# Patient Record
Sex: FEMALE | Race: WHITE | Employment: OTHER | URBAN - METROPOLITAN AREA
[De-identification: names, ages, dates, MRNs, and addresses within clinical notes are randomized per-mention and may not be internally consistent; named-entity substitution may affect disease eponyms.]

---

## 2017-01-11 PROBLEM — M25.819 SHOULDER IMPINGEMENT: Status: RESOLVED | Noted: 2017-01-11 | Resolved: 2017-01-11

## 2017-01-11 PROBLEM — M25.819 SHOULDER IMPINGEMENT: Status: ACTIVE | Noted: 2017-01-11

## 2017-01-11 PROBLEM — M75.40 SHOULDER IMPINGEMENT: Status: RESOLVED | Noted: 2017-01-11 | Resolved: 2017-01-11

## 2017-01-11 PROBLEM — M19.011 PRIMARY OSTEOARTHRITIS OF RIGHT SHOULDER: Status: ACTIVE | Noted: 2017-01-11

## 2017-01-11 PROBLEM — M75.40 SHOULDER IMPINGEMENT: Status: ACTIVE | Noted: 2017-01-11

## 2018-07-07 ENCOUNTER — HOSPITAL ENCOUNTER (OUTPATIENT)
Age: 78
Discharge: HOME | End: 2018-07-07
Payer: MEDICARE

## 2018-07-07 LAB
ALBUMIN SERPL-MCNC: 4.2 G/DL (ref 3.5–5.2)
ALP BLD-CCNC: 84 U/L (ref 35–104)
ALT SERPL-CCNC: 13 U/L (ref 0–32)
ANION GAP SERPL CALCULATED.3IONS-SCNC: 12 MMOL/L (ref 7–16)
AST SERPL-CCNC: 20 U/L (ref 0–31)
BILIRUB SERPL-MCNC: 0.6 MG/DL (ref 0–1.2)
BUN BLDV-MCNC: 11 MG/DL (ref 8–23)
CALCIUM SERPL-MCNC: 9.4 MG/DL (ref 8.6–10.2)
CHLORIDE BLD-SCNC: 102 MMOL/L (ref 98–107)
CHOLESTEROL, FASTING: 174 MG/DL (ref 0–199)
CO2: 27 MMOL/L (ref 22–29)
CREAT SERPL-MCNC: 0.8 MG/DL (ref 0.5–1)
GFR AFRICAN AMERICAN: >60
GFR NON-AFRICAN AMERICAN: >60 ML/MIN/1.73
GLUCOSE FASTING: 86 MG/DL (ref 74–109)
HCT VFR BLD CALC: 38.8 % (ref 34–48)
HDLC SERPL-MCNC: 82 MG/DL
HEMOGLOBIN: 12.9 G/DL (ref 11.5–15.5)
LDL CHOLESTEROL CALCULATED: 74 MG/DL (ref 0–99)
MCH RBC QN AUTO: 31.1 PG (ref 26–35)
MCHC RBC AUTO-ENTMCNC: 33.2 % (ref 32–34.5)
MCV RBC AUTO: 93.5 FL (ref 80–99.9)
PDW BLD-RTO: 12.2 FL (ref 11.5–15)
PLATELET # BLD: 252 E9/L (ref 130–450)
PMV BLD AUTO: 9.5 FL (ref 7–12)
POTASSIUM SERPL-SCNC: 3.9 MMOL/L (ref 3.5–5)
RBC # BLD: 4.15 E12/L (ref 3.5–5.5)
SODIUM BLD-SCNC: 141 MMOL/L (ref 132–146)
TOTAL PROTEIN: 6.8 G/DL (ref 6.4–8.3)
TRIGLYCERIDE, FASTING: 90 MG/DL (ref 0–149)
VLDLC SERPL CALC-MCNC: 18 MG/DL
WBC # BLD: 5.6 E9/L (ref 4.5–11.5)

## 2018-07-07 PROCEDURE — 36415 COLL VENOUS BLD VENIPUNCTURE: CPT

## 2018-07-07 PROCEDURE — 85027 COMPLETE CBC AUTOMATED: CPT

## 2018-07-07 PROCEDURE — 80053 COMPREHEN METABOLIC PANEL: CPT

## 2018-07-07 PROCEDURE — 80061 LIPID PANEL: CPT

## 2018-07-13 ENCOUNTER — HOSPITAL ENCOUNTER (OUTPATIENT)
Age: 78
Discharge: HOME | End: 2018-07-15
Payer: MEDICARE

## 2018-07-13 ENCOUNTER — HOSPITAL ENCOUNTER (OUTPATIENT)
Dept: GENERAL RADIOLOGY | Age: 78
Discharge: HOME | End: 2018-07-15
Payer: MEDICARE

## 2018-07-13 DIAGNOSIS — M19.90 ARTHRITIS: ICD-10-CM

## 2018-07-13 PROCEDURE — 73030 X-RAY EXAM OF SHOULDER: CPT

## 2018-07-13 PROCEDURE — 73564 X-RAY EXAM KNEE 4 OR MORE: CPT

## 2018-10-11 ENCOUNTER — OFFICE VISIT (OUTPATIENT)
Dept: ORTHOPEDIC SURGERY | Age: 78
End: 2018-10-11
Payer: MEDICARE

## 2018-10-11 VITALS
TEMPERATURE: 97.7 F | DIASTOLIC BLOOD PRESSURE: 82 MMHG | WEIGHT: 142 LBS | HEIGHT: 63 IN | HEART RATE: 94 BPM | SYSTOLIC BLOOD PRESSURE: 144 MMHG | BODY MASS INDEX: 25.16 KG/M2

## 2018-10-11 DIAGNOSIS — M19.011 PRIMARY OSTEOARTHRITIS OF RIGHT SHOULDER: ICD-10-CM

## 2018-10-11 DIAGNOSIS — M17.11 PRIMARY OSTEOARTHRITIS OF RIGHT KNEE: Primary | ICD-10-CM

## 2018-10-11 PROCEDURE — 1036F TOBACCO NON-USER: CPT | Performed by: ORTHOPAEDIC SURGERY

## 2018-10-11 PROCEDURE — G8419 CALC BMI OUT NRM PARAM NOF/U: HCPCS | Performed by: ORTHOPAEDIC SURGERY

## 2018-10-11 PROCEDURE — G8484 FLU IMMUNIZE NO ADMIN: HCPCS | Performed by: ORTHOPAEDIC SURGERY

## 2018-10-11 PROCEDURE — 99214 OFFICE O/P EST MOD 30 MIN: CPT | Performed by: ORTHOPAEDIC SURGERY

## 2018-10-11 PROCEDURE — G8427 DOCREV CUR MEDS BY ELIG CLIN: HCPCS | Performed by: ORTHOPAEDIC SURGERY

## 2018-10-11 PROCEDURE — 4040F PNEUMOC VAC/ADMIN/RCVD: CPT | Performed by: ORTHOPAEDIC SURGERY

## 2018-10-11 PROCEDURE — 20610 DRAIN/INJ JOINT/BURSA W/O US: CPT | Performed by: ORTHOPAEDIC SURGERY

## 2018-10-11 PROCEDURE — G8399 PT W/DXA RESULTS DOCUMENT: HCPCS | Performed by: ORTHOPAEDIC SURGERY

## 2018-10-11 PROCEDURE — 1123F ACP DISCUSS/DSCN MKR DOCD: CPT | Performed by: ORTHOPAEDIC SURGERY

## 2018-10-11 PROCEDURE — 1101F PT FALLS ASSESS-DOCD LE1/YR: CPT | Performed by: ORTHOPAEDIC SURGERY

## 2018-10-11 PROCEDURE — 1090F PRES/ABSN URINE INCON ASSESS: CPT | Performed by: ORTHOPAEDIC SURGERY

## 2018-10-11 PROCEDURE — L1810 KO ELASTIC WITH JOINTS: HCPCS | Performed by: ORTHOPAEDIC SURGERY

## 2018-10-11 RX ORDER — TRIAMCINOLONE ACETONIDE 40 MG/ML
80 INJECTION, SUSPENSION INTRA-ARTICULAR; INTRAMUSCULAR ONCE
Status: COMPLETED | OUTPATIENT
Start: 2018-10-11 | End: 2018-10-11

## 2018-10-11 RX ORDER — PREDNISOLONE ACETATE 10 MG/ML
1 SUSPENSION/ DROPS OPHTHALMIC DAILY PRN
COMMUNITY
Start: 2018-08-21 | End: 2019-09-04 | Stop reason: ALTCHOICE

## 2018-10-11 RX ORDER — BUPIVACAINE HYDROCHLORIDE 2.5 MG/ML
3 INJECTION, SOLUTION INFILTRATION; PERINEURAL ONCE
Status: COMPLETED | OUTPATIENT
Start: 2018-10-11 | End: 2018-10-11

## 2018-10-11 RX ADMIN — TRIAMCINOLONE ACETONIDE 80 MG: 40 INJECTION, SUSPENSION INTRA-ARTICULAR; INTRAMUSCULAR at 10:45

## 2018-10-11 RX ADMIN — BUPIVACAINE HYDROCHLORIDE 7.5 MG: 2.5 INJECTION, SOLUTION INFILTRATION; PERINEURAL at 10:44

## 2018-10-11 NOTE — PROGRESS NOTES
 Cholecalciferol (VITAMIN D3) 3000 UNITS TABS Take 1,000 Units by mouth daily      acetaminophen (TYLENOL) 500 MG tablet Take 500 mg by mouth every 6 hours as needed for Pain      clopidogrel (PLAVIX) 75 MG tablet Take 75 mg by mouth daily.  NONFORMULARY 1 drop 4 times daily Indications: Blood serum eye drops, one drop each eye daily       No current facility-administered medications for this visit. No Known Allergies    Social History     Social History    Marital status:      Spouse name: N/A    Number of children: N/A    Years of education: N/A     Occupational History    retired      Social History Main Topics    Smoking status: Never Smoker    Smokeless tobacco: Never Used    Alcohol use No    Drug use: No    Sexual activity: Not Asked     Other Topics Concern    None     Social History Narrative    None       Family History   Problem Relation Age of Onset    Cancer Mother     Heart Disease Father          Review of Systems   No fever, chills, or other constitutionalsymptoms. No numbness or other neuro symptoms. [unfilled]   Right knee has no erythema or effusion but there is clinical valgus that is increased on stance. Range of motion 0-125°. No pain with right hip rotation. Right shoulder has some muscle atrophy no effusion. Able to flex and abduct near 90° with some crepitation. Gross strength is intact. Physical Exam    Patient is alert and oriented. Well-developed well-nourished. BMI 25  Pupils equal and reactive. Scleraeanicteric. Neck supple  Lungs clear. Cardiac rate and rhythm regular. Abdomen soft and nontender. Skin warm and dry. XRAY: Recent right knee x-rays multiple views reviewed. Significant diffuse osteopenia. They are not weightbearing views, but significant degenerative changes are suggested in the lateral compartment with osteophyte formation and some sclerosis subchondral lateral femoral condyle.  Patellofemoral degenerative changes are also present. Impression: Severe primarily lateral compartment osteoarthritis right knee. ASSESSMENT/PLAN:    Harriet Lozano was seen today for knee pain and shoulder pain. Diagnoses and all orders for this visit:    Primary osteoarthritis of right knee  -     [place] ARTHROCENTESIS ASPIR&/INJ MAJOR JT/BURSA W/O US  -     Ambulatory referral to Physical Therapy  -     [place] KO ELASTIC WITH JOINTS    Primary osteoarthritis of right shoulder    Other orders  -     bupivacaine (MARCAINE) 0.25 % injection 7.5 mg; Inject 3 mLs into the articular space once  -     triamcinolone acetonide (KENALOG-40) injection 80 mg; Inject 2 mLs into the articular space once     With respect to her right knee, conservative versus operative options were reviewed. Given the severity of her objective findings, I think likely that only total knee arthroplasty will provide major relief. Her only family is in [facility] and she feels that if she was to have any surgery she would have to pursue with there. In terms of conservative measures she did wish to try steroid injection, hinged knee brace and physical therapy. After review of indications, risks and limitations, after alcohol prep, right knee injection with triamcinolone 80mg and 3 cc .25% marcaine given today. Pt tolerated without complication. Return if symptoms worsen or fail to improve. Harriet Lozano will follow-up with me depending on her response to the above measures.       Kami Koo MD    10/11/2018  1:18 PM

## 2018-11-20 ENCOUNTER — EVALUATION (OUTPATIENT)
Dept: PHYSICAL THERAPY | Age: 78
End: 2018-11-20
Payer: MEDICARE

## 2018-11-20 DIAGNOSIS — M17.11 PRIMARY OSTEOARTHRITIS OF RIGHT KNEE: Primary | ICD-10-CM

## 2018-11-20 DIAGNOSIS — M53.2X7 SPINAL INSTABILITY, LUMBOSACRAL: ICD-10-CM

## 2018-11-20 DIAGNOSIS — M54.41 RIGHT-SIDED LOW BACK PAIN WITH RIGHT-SIDED SCIATICA, UNSPECIFIED CHRONICITY: ICD-10-CM

## 2018-11-20 PROCEDURE — 97535 SELF CARE MNGMENT TRAINING: CPT | Performed by: PHYSICAL THERAPIST

## 2018-11-20 PROCEDURE — 97112 NEUROMUSCULAR REEDUCATION: CPT | Performed by: PHYSICAL THERAPIST

## 2018-11-20 PROCEDURE — 97162 PT EVAL MOD COMPLEX 30 MIN: CPT | Performed by: PHYSICAL THERAPIST

## 2018-11-21 PROCEDURE — G8978 MOBILITY CURRENT STATUS: HCPCS | Performed by: PHYSICAL THERAPIST

## 2018-11-21 PROCEDURE — G8979 MOBILITY GOAL STATUS: HCPCS | Performed by: PHYSICAL THERAPIST

## 2018-11-23 ENCOUNTER — TREATMENT (OUTPATIENT)
Dept: PHYSICAL THERAPY | Age: 78
End: 2018-11-23
Payer: MEDICARE

## 2018-11-23 DIAGNOSIS — M17.11 PRIMARY OSTEOARTHRITIS OF RIGHT KNEE: Primary | ICD-10-CM

## 2018-11-23 DIAGNOSIS — M53.2X7 SPINAL INSTABILITY, LUMBOSACRAL: ICD-10-CM

## 2018-11-23 DIAGNOSIS — M54.41 RIGHT-SIDED LOW BACK PAIN WITH RIGHT-SIDED SCIATICA, UNSPECIFIED CHRONICITY: ICD-10-CM

## 2018-11-23 PROCEDURE — 97530 THERAPEUTIC ACTIVITIES: CPT | Performed by: PHYSICAL THERAPIST

## 2018-11-23 PROCEDURE — 97110 THERAPEUTIC EXERCISES: CPT | Performed by: PHYSICAL THERAPIST

## 2018-11-23 PROCEDURE — 97112 NEUROMUSCULAR REEDUCATION: CPT | Performed by: PHYSICAL THERAPIST

## 2018-12-04 ENCOUNTER — TREATMENT (OUTPATIENT)
Dept: PHYSICAL THERAPY | Age: 78
End: 2018-12-04
Payer: MEDICARE

## 2018-12-04 DIAGNOSIS — M17.11 PRIMARY OSTEOARTHRITIS OF RIGHT KNEE: Primary | ICD-10-CM

## 2018-12-04 DIAGNOSIS — M54.41 RIGHT-SIDED LOW BACK PAIN WITH RIGHT-SIDED SCIATICA, UNSPECIFIED CHRONICITY: ICD-10-CM

## 2018-12-04 PROCEDURE — 97140 MANUAL THERAPY 1/> REGIONS: CPT | Performed by: PHYSICAL THERAPIST

## 2018-12-04 PROCEDURE — 97112 NEUROMUSCULAR REEDUCATION: CPT | Performed by: PHYSICAL THERAPIST

## 2018-12-04 PROCEDURE — 97110 THERAPEUTIC EXERCISES: CPT | Performed by: PHYSICAL THERAPIST

## 2018-12-07 ENCOUNTER — TREATMENT (OUTPATIENT)
Dept: PHYSICAL THERAPY | Age: 78
End: 2018-12-07
Payer: MEDICARE

## 2018-12-07 DIAGNOSIS — M17.11 PRIMARY OSTEOARTHRITIS OF RIGHT KNEE: Primary | ICD-10-CM

## 2018-12-07 DIAGNOSIS — M53.2X7 SPINAL INSTABILITY, LUMBOSACRAL: ICD-10-CM

## 2018-12-07 DIAGNOSIS — M54.41 RIGHT-SIDED LOW BACK PAIN WITH RIGHT-SIDED SCIATICA, UNSPECIFIED CHRONICITY: ICD-10-CM

## 2018-12-07 PROCEDURE — 97016 VASOPNEUMATIC DEVICE THERAPY: CPT

## 2018-12-07 PROCEDURE — 97530 THERAPEUTIC ACTIVITIES: CPT

## 2018-12-07 PROCEDURE — 97112 NEUROMUSCULAR REEDUCATION: CPT

## 2018-12-11 ENCOUNTER — TREATMENT (OUTPATIENT)
Dept: PHYSICAL THERAPY | Age: 78
End: 2018-12-11
Payer: MEDICARE

## 2018-12-11 DIAGNOSIS — M53.2X7 SPINAL INSTABILITY, LUMBOSACRAL: ICD-10-CM

## 2018-12-11 DIAGNOSIS — M17.11 PRIMARY OSTEOARTHRITIS OF RIGHT KNEE: Primary | ICD-10-CM

## 2018-12-11 DIAGNOSIS — M54.41 RIGHT-SIDED LOW BACK PAIN WITH RIGHT-SIDED SCIATICA, UNSPECIFIED CHRONICITY: ICD-10-CM

## 2018-12-11 PROCEDURE — 97535 SELF CARE MNGMENT TRAINING: CPT | Performed by: PHYSICAL THERAPIST

## 2018-12-11 PROCEDURE — 97035 APP MDLTY 1+ULTRASOUND EA 15: CPT | Performed by: PHYSICAL THERAPIST

## 2018-12-11 PROCEDURE — 97110 THERAPEUTIC EXERCISES: CPT | Performed by: PHYSICAL THERAPIST

## 2018-12-11 PROCEDURE — 97140 MANUAL THERAPY 1/> REGIONS: CPT | Performed by: PHYSICAL THERAPIST

## 2018-12-13 ENCOUNTER — TREATMENT (OUTPATIENT)
Dept: PHYSICAL THERAPY | Age: 78
End: 2018-12-13
Payer: MEDICARE

## 2018-12-13 DIAGNOSIS — M53.2X7 SPINAL INSTABILITY, LUMBOSACRAL: ICD-10-CM

## 2018-12-13 DIAGNOSIS — M17.11 PRIMARY OSTEOARTHRITIS OF RIGHT KNEE: Primary | ICD-10-CM

## 2018-12-13 DIAGNOSIS — M54.41 RIGHT-SIDED LOW BACK PAIN WITH RIGHT-SIDED SCIATICA, UNSPECIFIED CHRONICITY: ICD-10-CM

## 2018-12-13 PROCEDURE — 97140 MANUAL THERAPY 1/> REGIONS: CPT | Performed by: PHYSICAL THERAPIST

## 2018-12-13 PROCEDURE — 97112 NEUROMUSCULAR REEDUCATION: CPT | Performed by: PHYSICAL THERAPIST

## 2018-12-13 PROCEDURE — 97110 THERAPEUTIC EXERCISES: CPT | Performed by: PHYSICAL THERAPIST

## 2018-12-18 ENCOUNTER — HOSPITAL ENCOUNTER (OUTPATIENT)
Dept: MAMMOGRAPHY | Age: 78
Discharge: HOME | End: 2018-12-20
Payer: MEDICARE

## 2018-12-18 ENCOUNTER — TREATMENT (OUTPATIENT)
Dept: PHYSICAL THERAPY | Age: 78
End: 2018-12-18
Payer: MEDICARE

## 2018-12-18 DIAGNOSIS — M54.41 RIGHT-SIDED LOW BACK PAIN WITH RIGHT-SIDED SCIATICA, UNSPECIFIED CHRONICITY: ICD-10-CM

## 2018-12-18 DIAGNOSIS — M53.2X7 SPINAL INSTABILITY, LUMBOSACRAL: ICD-10-CM

## 2018-12-18 DIAGNOSIS — M17.11 PRIMARY OSTEOARTHRITIS OF RIGHT KNEE: Primary | ICD-10-CM

## 2018-12-18 DIAGNOSIS — Z12.31 ENCOUNTER FOR SCREENING MAMMOGRAM FOR MALIGNANT NEOPLASM OF BREAST: ICD-10-CM

## 2018-12-18 PROCEDURE — 97140 MANUAL THERAPY 1/> REGIONS: CPT | Performed by: PHYSICAL THERAPIST

## 2018-12-18 PROCEDURE — 77063 BREAST TOMOSYNTHESIS BI: CPT

## 2018-12-18 PROCEDURE — 97530 THERAPEUTIC ACTIVITIES: CPT | Performed by: PHYSICAL THERAPIST

## 2018-12-18 PROCEDURE — 97112 NEUROMUSCULAR REEDUCATION: CPT | Performed by: PHYSICAL THERAPIST

## 2018-12-18 PROCEDURE — 97016 VASOPNEUMATIC DEVICE THERAPY: CPT | Performed by: PHYSICAL THERAPIST

## 2018-12-18 NOTE — PROGRESS NOTES
on rehab sheet and will      be discharged to HEP. [] Tolerated today's therapy session with:  [x] Comment: manual therapy corrected the inflare and anterior tilt of R ilium. Fatigued with exercises. PT instructed her and she practiced self anterior tilt correction technique for home. Advised her to perform at least qd or as needed as she feels \"shorter on RLE\" weight bearing. 6/10 knee pain following exercises. []  Other:    Method of Education: [x] Verbal  [x] Demo  [] Written    Comprehension of Education:  [x] Verbalizes understanding. [] Demonstrates understanding. [] Needs review. [] Demonstrates/verbalizes HEP/Ed previously given. PLAN       [] Re-evaluate next session. [x] Continue per plan of care. [] Progress to discharge/HEP over the next ____ visits. [] Hold skilled PT d/t:    [] Discharge from skilled PT services                [x] Advance:   PREs/CKC as tolerated  [] Discontinue:     due to:     [x] Monitor between session response to: change in manual therapy and progression in exercises.        Electronically signed by:  Darion Watkins, [address] [place] [address]

## 2018-12-20 ENCOUNTER — TREATMENT (OUTPATIENT)
Dept: PHYSICAL THERAPY | Age: 78
End: 2018-12-20
Payer: MEDICARE

## 2018-12-20 DIAGNOSIS — M17.11 PRIMARY OSTEOARTHRITIS OF RIGHT KNEE: Primary | ICD-10-CM

## 2018-12-20 DIAGNOSIS — M54.41 RIGHT-SIDED LOW BACK PAIN WITH RIGHT-SIDED SCIATICA, UNSPECIFIED CHRONICITY: ICD-10-CM

## 2018-12-20 DIAGNOSIS — M53.2X7 SPINAL INSTABILITY, LUMBOSACRAL: ICD-10-CM

## 2018-12-20 PROCEDURE — 97140 MANUAL THERAPY 1/> REGIONS: CPT | Performed by: PHYSICAL THERAPIST

## 2018-12-20 PROCEDURE — 97016 VASOPNEUMATIC DEVICE THERAPY: CPT | Performed by: PHYSICAL THERAPIST

## 2018-12-20 PROCEDURE — 97112 NEUROMUSCULAR REEDUCATION: CPT | Performed by: PHYSICAL THERAPIST

## 2018-12-20 PROCEDURE — 97530 THERAPEUTIC ACTIVITIES: CPT | Performed by: PHYSICAL THERAPIST

## 2018-12-20 NOTE — PROGRESS NOTES
PHYSICAL THERAPY DAILY TREATMENT NOTE    Date:  2018   Patient: Tavo Winkler  : 1940  MRN: [id number]  Physician: Joe Castaneda MD  [facility]       Dx: Primary osteoarthritis of right knee [M17.11]    Treatment Dx:    Visit Diagnoses       Codes    Right-sided low back pain with right-sided sciatica, unspecified chronicity     M54.41    Spinal instability, lumbosacral     M53.2X7            Area treated: *R knee    SUBJECTIVE     Pain:  [] Yes  [] No Location:   Pain Rating: (0-10 scale) /10    Daily Comments: Pt reports she still feels like her R LE is shorter than her left and she is walking unbalanced due to this. Outcomes:     OBJECTIVE     See rehab sheet for today's treatment    Time In: 10:30 a           Time Out: 11:45             Visit #: 8    Treatment Charges:   Mins Units   Modalities   15 1   Ther Exercise   4 0   Neuromuscular Re-Education   34 2   Thera Activities   10 1   Manual Therapy   12 1   ADL/Home Mgt       Gait Training       Group Therapy       Non-Billable Service Time       Other     Total Time/Units 75 5     [] Group therapy/ non-billed for portions of treatment that were considered simultaneous activities per certain provider guidelines. [] Pre/post/intraservice treatment measurements included in treatment:    []   Ther Ex                                                                                                                          []   Manual therapy                                                                                                           []   Other:    Palpation:  ROM:  Strength:  Joint Integrity:    ASSESSMENT       [] Progressing toward goals. [] No change  [] Patient required verbal/tactile cueing for prompting/correction for:  [] Patient demonstrates ability to progress intensity for the following therapeutic procedures per rehab sheet.   [] Patient demonstrates independence in procedures

## 2018-12-28 ENCOUNTER — TREATMENT (OUTPATIENT)
Dept: PHYSICAL THERAPY | Age: 78
End: 2018-12-28
Payer: MEDICARE

## 2018-12-28 DIAGNOSIS — M17.11 PRIMARY OSTEOARTHRITIS OF RIGHT KNEE: Primary | ICD-10-CM

## 2018-12-28 DIAGNOSIS — M53.2X7 SPINAL INSTABILITY, LUMBOSACRAL: ICD-10-CM

## 2018-12-28 DIAGNOSIS — M54.41 RIGHT-SIDED LOW BACK PAIN WITH RIGHT-SIDED SCIATICA, UNSPECIFIED CHRONICITY: ICD-10-CM

## 2018-12-28 PROCEDURE — 97112 NEUROMUSCULAR REEDUCATION: CPT | Performed by: PHYSICAL THERAPIST

## 2018-12-28 PROCEDURE — 97140 MANUAL THERAPY 1/> REGIONS: CPT | Performed by: PHYSICAL THERAPIST

## 2018-12-28 PROCEDURE — 97530 THERAPEUTIC ACTIVITIES: CPT | Performed by: PHYSICAL THERAPIST

## 2018-12-28 PROCEDURE — 97016 VASOPNEUMATIC DEVICE THERAPY: CPT | Performed by: PHYSICAL THERAPIST

## 2018-12-28 NOTE — PROGRESS NOTES
and will      be discharged to HEP. [] Tolerated today's therapy session with:  [x] Comment:       Invasive Procedure Time Out:     Correct Patient -2 indentifiers:  [] Yes    Medical history, Contraindications, Review of Medications:  [] Yes    Location Confirmed:  [] Yes    Patient Agreement on Location: [] Yes    Informed Consent: Written    Pt. Education:  [] Yes  [] No                               []   Reviewed Prior HEP/Ed       []  Instructed in and practiced updated HEP. []  Other:    Method of Education: [] Verbal  [] Demo  [] Written    Comprehension of Education:  [] Verbalizes understanding. [] Demonstrates understanding. [] Needs review. [] Demonstrates/verbalizes HEP/Ed previously given. PLAN         [] Re-evaluate next session. [x] Continue per plan of care. [] Progress to discharge/HEP over the next ____ visits.   [] Hold skilled PT d/t:    [] Discharge from skilled PT services                [x] Advance:    as tolerated  [] Discontinue:     due to:     [] Monitor between session response to:      Electronically signed by:  Gema Silva [id number]

## 2018-12-31 ENCOUNTER — TREATMENT (OUTPATIENT)
Dept: PHYSICAL THERAPY | Age: 78
End: 2018-12-31
Payer: MEDICARE

## 2018-12-31 DIAGNOSIS — M17.11 PRIMARY OSTEOARTHRITIS OF RIGHT KNEE: Primary | ICD-10-CM

## 2018-12-31 DIAGNOSIS — M54.41 RIGHT-SIDED LOW BACK PAIN WITH RIGHT-SIDED SCIATICA, UNSPECIFIED CHRONICITY: ICD-10-CM

## 2018-12-31 DIAGNOSIS — M53.2X7 SPINAL INSTABILITY, LUMBOSACRAL: ICD-10-CM

## 2018-12-31 PROCEDURE — 97530 THERAPEUTIC ACTIVITIES: CPT | Performed by: PHYSICAL THERAPIST

## 2018-12-31 PROCEDURE — 97112 NEUROMUSCULAR REEDUCATION: CPT | Performed by: PHYSICAL THERAPIST

## 2018-12-31 PROCEDURE — 97016 VASOPNEUMATIC DEVICE THERAPY: CPT | Performed by: PHYSICAL THERAPIST

## 2018-12-31 PROCEDURE — G8979 MOBILITY GOAL STATUS: HCPCS | Performed by: PHYSICAL THERAPIST

## 2018-12-31 PROCEDURE — G8978 MOBILITY CURRENT STATUS: HCPCS | Performed by: PHYSICAL THERAPIST

## 2018-12-31 PROCEDURE — 97164 PT RE-EVAL EST PLAN CARE: CPT | Performed by: PHYSICAL THERAPIST

## 2019-01-04 ENCOUNTER — TREATMENT (OUTPATIENT)
Dept: PHYSICAL THERAPY | Age: 79
End: 2019-01-04
Payer: MEDICARE

## 2019-01-04 DIAGNOSIS — M53.2X7 SPINAL INSTABILITY, LUMBOSACRAL: ICD-10-CM

## 2019-01-04 DIAGNOSIS — M54.41 RIGHT-SIDED LOW BACK PAIN WITH RIGHT-SIDED SCIATICA, UNSPECIFIED CHRONICITY: ICD-10-CM

## 2019-01-04 DIAGNOSIS — M17.11 PRIMARY OSTEOARTHRITIS OF RIGHT KNEE: Primary | ICD-10-CM

## 2019-01-04 PROCEDURE — 97110 THERAPEUTIC EXERCISES: CPT | Performed by: PHYSICAL THERAPIST

## 2019-01-04 PROCEDURE — 97016 VASOPNEUMATIC DEVICE THERAPY: CPT | Performed by: PHYSICAL THERAPIST

## 2019-01-04 PROCEDURE — 97530 THERAPEUTIC ACTIVITIES: CPT | Performed by: PHYSICAL THERAPIST

## 2019-01-04 PROCEDURE — 97112 NEUROMUSCULAR REEDUCATION: CPT | Performed by: PHYSICAL THERAPIST

## 2019-01-08 ENCOUNTER — TREATMENT (OUTPATIENT)
Dept: PHYSICAL THERAPY | Age: 79
End: 2019-01-08
Payer: MEDICARE

## 2019-01-08 DIAGNOSIS — M54.41 RIGHT-SIDED LOW BACK PAIN WITH RIGHT-SIDED SCIATICA, UNSPECIFIED CHRONICITY: ICD-10-CM

## 2019-01-08 DIAGNOSIS — M53.2X7 SPINAL INSTABILITY, LUMBOSACRAL: ICD-10-CM

## 2019-01-08 DIAGNOSIS — M17.11 PRIMARY OSTEOARTHRITIS OF RIGHT KNEE: Primary | ICD-10-CM

## 2019-01-08 PROCEDURE — 97110 THERAPEUTIC EXERCISES: CPT | Performed by: PHYSICAL THERAPIST

## 2019-01-08 PROCEDURE — 97140 MANUAL THERAPY 1/> REGIONS: CPT | Performed by: PHYSICAL THERAPIST

## 2019-01-08 PROCEDURE — 97530 THERAPEUTIC ACTIVITIES: CPT | Performed by: PHYSICAL THERAPIST

## 2019-01-08 PROCEDURE — 97112 NEUROMUSCULAR REEDUCATION: CPT | Performed by: PHYSICAL THERAPIST

## 2019-01-10 ENCOUNTER — TREATMENT (OUTPATIENT)
Dept: PHYSICAL THERAPY | Age: 79
End: 2019-01-10
Payer: MEDICARE

## 2019-01-10 DIAGNOSIS — M53.2X7 SPINAL INSTABILITY, LUMBOSACRAL: ICD-10-CM

## 2019-01-10 DIAGNOSIS — M17.11 PRIMARY OSTEOARTHRITIS OF RIGHT KNEE: Primary | ICD-10-CM

## 2019-01-10 DIAGNOSIS — M54.41 RIGHT-SIDED LOW BACK PAIN WITH RIGHT-SIDED SCIATICA, UNSPECIFIED CHRONICITY: ICD-10-CM

## 2019-01-10 PROCEDURE — 97530 THERAPEUTIC ACTIVITIES: CPT | Performed by: PHYSICAL THERAPIST

## 2019-01-10 PROCEDURE — 97110 THERAPEUTIC EXERCISES: CPT | Performed by: PHYSICAL THERAPIST

## 2019-01-10 PROCEDURE — 97112 NEUROMUSCULAR REEDUCATION: CPT | Performed by: PHYSICAL THERAPIST

## 2019-01-14 ENCOUNTER — TREATMENT (OUTPATIENT)
Dept: PHYSICAL THERAPY | Age: 79
End: 2019-01-14
Payer: MEDICARE

## 2019-01-14 DIAGNOSIS — M53.2X7 SPINAL INSTABILITY, LUMBOSACRAL: ICD-10-CM

## 2019-01-14 DIAGNOSIS — M17.11 PRIMARY OSTEOARTHRITIS OF RIGHT KNEE: Primary | ICD-10-CM

## 2019-01-14 DIAGNOSIS — M54.41 RIGHT-SIDED LOW BACK PAIN WITH RIGHT-SIDED SCIATICA, UNSPECIFIED CHRONICITY: ICD-10-CM

## 2019-01-14 PROCEDURE — 97110 THERAPEUTIC EXERCISES: CPT | Performed by: PHYSICAL THERAPIST

## 2019-01-14 PROCEDURE — 97530 THERAPEUTIC ACTIVITIES: CPT | Performed by: PHYSICAL THERAPIST

## 2019-01-14 PROCEDURE — 97112 NEUROMUSCULAR REEDUCATION: CPT | Performed by: PHYSICAL THERAPIST

## 2019-01-18 ENCOUNTER — TREATMENT (OUTPATIENT)
Dept: PHYSICAL THERAPY | Age: 79
End: 2019-01-18
Payer: MEDICARE

## 2019-01-18 DIAGNOSIS — M17.11 PRIMARY OSTEOARTHRITIS OF RIGHT KNEE: ICD-10-CM

## 2019-01-18 DIAGNOSIS — M53.2X7 SPINAL INSTABILITY, LUMBOSACRAL: ICD-10-CM

## 2019-01-18 DIAGNOSIS — M54.41 RIGHT-SIDED LOW BACK PAIN WITH RIGHT-SIDED SCIATICA, UNSPECIFIED CHRONICITY: Primary | ICD-10-CM

## 2019-01-18 PROCEDURE — 97530 THERAPEUTIC ACTIVITIES: CPT | Performed by: PHYSICAL THERAPIST

## 2019-01-18 PROCEDURE — 97110 THERAPEUTIC EXERCISES: CPT | Performed by: PHYSICAL THERAPIST

## 2019-01-18 PROCEDURE — 97112 NEUROMUSCULAR REEDUCATION: CPT | Performed by: PHYSICAL THERAPIST

## 2019-01-24 ENCOUNTER — TREATMENT (OUTPATIENT)
Dept: PHYSICAL THERAPY | Age: 79
End: 2019-01-24
Payer: MEDICARE

## 2019-01-24 DIAGNOSIS — M54.41 RIGHT-SIDED LOW BACK PAIN WITH RIGHT-SIDED SCIATICA, UNSPECIFIED CHRONICITY: Primary | ICD-10-CM

## 2019-01-24 DIAGNOSIS — M53.2X7 SPINAL INSTABILITY, LUMBOSACRAL: ICD-10-CM

## 2019-01-24 DIAGNOSIS — M17.11 PRIMARY OSTEOARTHRITIS OF RIGHT KNEE: ICD-10-CM

## 2019-01-24 PROCEDURE — 97112 NEUROMUSCULAR REEDUCATION: CPT | Performed by: PHYSICAL THERAPIST

## 2019-01-24 PROCEDURE — 97110 THERAPEUTIC EXERCISES: CPT | Performed by: PHYSICAL THERAPIST

## 2019-01-24 PROCEDURE — 97530 THERAPEUTIC ACTIVITIES: CPT | Performed by: PHYSICAL THERAPIST

## 2019-01-29 ENCOUNTER — TREATMENT (OUTPATIENT)
Dept: PHYSICAL THERAPY | Age: 79
End: 2019-01-29
Payer: MEDICARE

## 2019-01-29 DIAGNOSIS — M53.2X7 SPINAL INSTABILITY, LUMBOSACRAL: ICD-10-CM

## 2019-01-29 DIAGNOSIS — M54.41 RIGHT-SIDED LOW BACK PAIN WITH RIGHT-SIDED SCIATICA, UNSPECIFIED CHRONICITY: Primary | ICD-10-CM

## 2019-01-29 DIAGNOSIS — M17.11 PRIMARY OSTEOARTHRITIS OF RIGHT KNEE: ICD-10-CM

## 2019-01-29 PROCEDURE — 97530 THERAPEUTIC ACTIVITIES: CPT | Performed by: PHYSICAL THERAPIST

## 2019-01-29 PROCEDURE — 97110 THERAPEUTIC EXERCISES: CPT | Performed by: PHYSICAL THERAPIST

## 2019-01-29 PROCEDURE — 97112 NEUROMUSCULAR REEDUCATION: CPT | Performed by: PHYSICAL THERAPIST

## 2019-01-31 ENCOUNTER — TREATMENT (OUTPATIENT)
Dept: PHYSICAL THERAPY | Age: 79
End: 2019-01-31
Payer: MEDICARE

## 2019-01-31 DIAGNOSIS — M54.41 RIGHT-SIDED LOW BACK PAIN WITH RIGHT-SIDED SCIATICA, UNSPECIFIED CHRONICITY: Primary | ICD-10-CM

## 2019-01-31 DIAGNOSIS — M17.11 PRIMARY OSTEOARTHRITIS OF RIGHT KNEE: ICD-10-CM

## 2019-01-31 DIAGNOSIS — M53.2X7 SPINAL INSTABILITY, LUMBOSACRAL: ICD-10-CM

## 2019-01-31 PROCEDURE — 97530 THERAPEUTIC ACTIVITIES: CPT | Performed by: PHYSICAL THERAPIST

## 2019-01-31 PROCEDURE — 97110 THERAPEUTIC EXERCISES: CPT | Performed by: PHYSICAL THERAPIST

## 2019-01-31 PROCEDURE — 97112 NEUROMUSCULAR REEDUCATION: CPT | Performed by: PHYSICAL THERAPIST

## 2019-02-05 ENCOUNTER — TREATMENT (OUTPATIENT)
Dept: PHYSICAL THERAPY | Age: 79
End: 2019-02-05
Payer: MEDICARE

## 2019-02-05 DIAGNOSIS — M54.41 RIGHT-SIDED LOW BACK PAIN WITH RIGHT-SIDED SCIATICA, UNSPECIFIED CHRONICITY: Primary | ICD-10-CM

## 2019-02-05 DIAGNOSIS — M53.2X7 SPINAL INSTABILITY, LUMBOSACRAL: ICD-10-CM

## 2019-02-05 DIAGNOSIS — M17.11 PRIMARY OSTEOARTHRITIS OF RIGHT KNEE: ICD-10-CM

## 2019-02-05 PROCEDURE — 97530 THERAPEUTIC ACTIVITIES: CPT | Performed by: PHYSICAL THERAPIST

## 2019-02-05 PROCEDURE — 97112 NEUROMUSCULAR REEDUCATION: CPT | Performed by: PHYSICAL THERAPIST

## 2019-02-05 PROCEDURE — 97110 THERAPEUTIC EXERCISES: CPT | Performed by: PHYSICAL THERAPIST

## 2019-02-07 ENCOUNTER — TREATMENT (OUTPATIENT)
Dept: PHYSICAL THERAPY | Age: 79
End: 2019-02-07
Payer: MEDICARE

## 2019-02-07 DIAGNOSIS — M54.41 RIGHT-SIDED LOW BACK PAIN WITH RIGHT-SIDED SCIATICA, UNSPECIFIED CHRONICITY: Primary | ICD-10-CM

## 2019-02-07 DIAGNOSIS — M53.2X7 SPINAL INSTABILITY, LUMBOSACRAL: ICD-10-CM

## 2019-02-07 DIAGNOSIS — M17.11 PRIMARY OSTEOARTHRITIS OF RIGHT KNEE: ICD-10-CM

## 2019-02-07 PROCEDURE — 97164 PT RE-EVAL EST PLAN CARE: CPT | Performed by: PHYSICAL THERAPIST

## 2019-02-07 PROCEDURE — 97530 THERAPEUTIC ACTIVITIES: CPT | Performed by: PHYSICAL THERAPIST

## 2019-02-07 PROCEDURE — G8980 MOBILITY D/C STATUS: HCPCS | Performed by: PHYSICAL THERAPIST

## 2019-02-07 PROCEDURE — 97110 THERAPEUTIC EXERCISES: CPT | Performed by: PHYSICAL THERAPIST

## 2019-02-07 PROCEDURE — 97112 NEUROMUSCULAR REEDUCATION: CPT | Performed by: PHYSICAL THERAPIST

## 2019-02-07 PROCEDURE — G8979 MOBILITY GOAL STATUS: HCPCS | Performed by: PHYSICAL THERAPIST

## 2019-03-07 ENCOUNTER — HOSPITAL ENCOUNTER (OUTPATIENT)
Age: 79
Discharge: HOME | End: 2019-03-07
Payer: MEDICARE

## 2019-03-07 ENCOUNTER — OFFICE VISIT (OUTPATIENT)
Dept: CARDIOLOGY CLINIC | Age: 79
End: 2019-03-07
Payer: MEDICARE

## 2019-03-07 VITALS
WEIGHT: 146 LBS | SYSTOLIC BLOOD PRESSURE: 136 MMHG | HEIGHT: 63 IN | DIASTOLIC BLOOD PRESSURE: 82 MMHG | BODY MASS INDEX: 25.87 KG/M2 | RESPIRATION RATE: 16 BRPM | HEART RATE: 83 BPM

## 2019-03-07 DIAGNOSIS — R60.0 LOWER EXTREMITY EDEMA: ICD-10-CM

## 2019-03-07 DIAGNOSIS — R07.9 CHEST PAIN, UNSPECIFIED TYPE: Primary | ICD-10-CM

## 2019-03-07 LAB
ALBUMIN SERPL-MCNC: 4.2 G/DL (ref 3.5–5.2)
ALP BLD-CCNC: 94 U/L (ref 35–104)
ALT SERPL-CCNC: 15 U/L (ref 0–32)
ANION GAP SERPL CALCULATED.3IONS-SCNC: 11 MMOL/L (ref 7–16)
AST SERPL-CCNC: 19 U/L (ref 0–31)
BILIRUB SERPL-MCNC: 0.5 MG/DL (ref 0–1.2)
BUN BLDV-MCNC: 10 MG/DL (ref 8–23)
CALCIUM SERPL-MCNC: 9.3 MG/DL (ref 8.6–10.2)
CHLORIDE BLD-SCNC: 100 MMOL/L (ref 98–107)
CHOLESTEROL, FASTING: 172 MG/DL (ref 0–199)
CO2: 28 MMOL/L (ref 22–29)
CREAT SERPL-MCNC: 0.8 MG/DL (ref 0.5–1)
GFR AFRICAN AMERICAN: >60
GFR NON-AFRICAN AMERICAN: >60 ML/MIN/1.73
GLUCOSE BLD-MCNC: 88 MG/DL (ref 74–99)
HCT VFR BLD CALC: 40.2 % (ref 34–48)
HDLC SERPL-MCNC: 85 MG/DL
HEMOGLOBIN: 13 G/DL (ref 11.5–15.5)
LDL CHOLESTEROL CALCULATED: 68 MG/DL (ref 0–99)
MCH RBC QN AUTO: 30.8 PG (ref 26–35)
MCHC RBC AUTO-ENTMCNC: 32.3 % (ref 32–34.5)
MCV RBC AUTO: 95.3 FL (ref 80–99.9)
PDW BLD-RTO: 12.3 FL (ref 11.5–15)
PLATELET # BLD: 250 E9/L (ref 130–450)
PMV BLD AUTO: 9.8 FL (ref 7–12)
POTASSIUM SERPL-SCNC: 4.4 MMOL/L (ref 3.5–5)
RBC # BLD: 4.22 E12/L (ref 3.5–5.5)
SODIUM BLD-SCNC: 139 MMOL/L (ref 132–146)
T4 FREE: 1.15 NG/DL (ref 0.93–1.7)
TOTAL PROTEIN: 7.1 G/DL (ref 6.4–8.3)
TRIGLYCERIDE, FASTING: 97 MG/DL (ref 0–149)
TSH SERPL DL<=0.05 MIU/L-ACNC: 1.88 UIU/ML (ref 0.27–4.2)
VLDLC SERPL CALC-MCNC: 19 MG/DL
WBC # BLD: 4.7 E9/L (ref 4.5–11.5)

## 2019-03-07 PROCEDURE — 84439 ASSAY OF FREE THYROXINE: CPT

## 2019-03-07 PROCEDURE — 36415 COLL VENOUS BLD VENIPUNCTURE: CPT

## 2019-03-07 PROCEDURE — 84443 ASSAY THYROID STIM HORMONE: CPT

## 2019-03-07 PROCEDURE — 1101F PT FALLS ASSESS-DOCD LE1/YR: CPT | Performed by: INTERNAL MEDICINE

## 2019-03-07 PROCEDURE — 80053 COMPREHEN METABOLIC PANEL: CPT

## 2019-03-07 PROCEDURE — 99204 OFFICE O/P NEW MOD 45 MIN: CPT | Performed by: INTERNAL MEDICINE

## 2019-03-07 PROCEDURE — G8419 CALC BMI OUT NRM PARAM NOF/U: HCPCS | Performed by: INTERNAL MEDICINE

## 2019-03-07 PROCEDURE — 80061 LIPID PANEL: CPT

## 2019-03-07 PROCEDURE — 85027 COMPLETE CBC AUTOMATED: CPT

## 2019-03-07 PROCEDURE — G8427 DOCREV CUR MEDS BY ELIG CLIN: HCPCS | Performed by: INTERNAL MEDICINE

## 2019-03-07 PROCEDURE — G8484 FLU IMMUNIZE NO ADMIN: HCPCS | Performed by: INTERNAL MEDICINE

## 2019-03-07 PROCEDURE — 1090F PRES/ABSN URINE INCON ASSESS: CPT | Performed by: INTERNAL MEDICINE

## 2019-03-07 PROCEDURE — 83721 ASSAY OF BLOOD LIPOPROTEIN: CPT

## 2019-03-07 PROCEDURE — 93000 ELECTROCARDIOGRAM COMPLETE: CPT | Performed by: INTERNAL MEDICINE

## 2019-03-11 LAB
Lab: NORMAL
REPORT: NORMAL
THIS TEST SENT TO: NORMAL

## 2019-03-21 ENCOUNTER — HOSPITAL ENCOUNTER (OUTPATIENT)
Dept: CARDIOLOGY | Age: 79
Discharge: HOME | End: 2019-03-21
Payer: MEDICARE

## 2019-03-21 VITALS
HEART RATE: 116 BPM | HEIGHT: 63 IN | WEIGHT: 146 LBS | DIASTOLIC BLOOD PRESSURE: 78 MMHG | BODY MASS INDEX: 25.87 KG/M2 | SYSTOLIC BLOOD PRESSURE: 124 MMHG

## 2019-03-21 DIAGNOSIS — R07.9 CHEST PAIN, UNSPECIFIED TYPE: Primary | ICD-10-CM

## 2019-03-21 DIAGNOSIS — R07.9 CHEST PAIN, UNSPECIFIED TYPE: ICD-10-CM

## 2019-03-21 DIAGNOSIS — R60.0 LOWER EXTREMITY EDEMA: ICD-10-CM

## 2019-03-21 LAB
LV EF: 65 %
LVEF MODALITY: NORMAL

## 2019-03-21 PROCEDURE — 93306 TTE W/DOPPLER COMPLETE: CPT

## 2019-03-21 PROCEDURE — A9500 TC99M SESTAMIBI: HCPCS | Performed by: INTERNAL MEDICINE

## 2019-03-21 PROCEDURE — 2580000003 HC RX 258: Performed by: INTERNAL MEDICINE

## 2019-03-21 PROCEDURE — 93017 CV STRESS TEST TRACING ONLY: CPT

## 2019-03-21 PROCEDURE — 3430000000 HC RX DIAGNOSTIC RADIOPHARMACEUTICAL: Performed by: INTERNAL MEDICINE

## 2019-03-21 PROCEDURE — 6360000002 HC RX W HCPCS: Performed by: INTERNAL MEDICINE

## 2019-03-21 PROCEDURE — 78452 HT MUSCLE IMAGE SPECT MULT: CPT

## 2019-03-21 RX ORDER — SODIUM CHLORIDE 0.9 % (FLUSH) 0.9 %
10 SYRINGE (ML) INJECTION PRN
Status: DISCONTINUED | OUTPATIENT
Start: 2019-03-21 | End: 2019-03-22 | Stop reason: HOSPADM

## 2019-03-21 RX ADMIN — Medication 10 ML: at 10:37

## 2019-03-21 RX ADMIN — Medication 33.5 MILLICURIE: at 10:37

## 2019-03-21 RX ADMIN — Medication 10 ML: at 10:36

## 2019-03-21 RX ADMIN — REGADENOSON 0.4 MG: 0.08 INJECTION, SOLUTION INTRAVENOUS at 10:37

## 2019-03-21 RX ADMIN — Medication 10 ML: at 09:06

## 2019-03-21 RX ADMIN — Medication 10.5 MILLICURIE: at 09:05

## 2019-03-22 ENCOUNTER — TELEPHONE (OUTPATIENT)
Dept: CARDIOLOGY CLINIC | Age: 79
End: 2019-03-22

## 2019-03-22 PROBLEM — I35.1 NONRHEUMATIC AORTIC VALVE INSUFFICIENCY: Status: ACTIVE | Noted: 2019-03-22

## 2019-06-22 ENCOUNTER — APPOINTMENT (OUTPATIENT)
Dept: GENERAL RADIOLOGY | Age: 79
End: 2019-06-22
Payer: MEDICARE

## 2019-06-22 ENCOUNTER — HOSPITAL ENCOUNTER (EMERGENCY)
Age: 79
Discharge: HOME | End: 2019-06-22
Payer: MEDICARE

## 2019-06-22 VITALS
HEART RATE: 92 BPM | OXYGEN SATURATION: 99 % | RESPIRATION RATE: 16 BRPM | TEMPERATURE: 98.5 F | WEIGHT: 144 LBS | DIASTOLIC BLOOD PRESSURE: 81 MMHG | SYSTOLIC BLOOD PRESSURE: 181 MMHG | HEIGHT: 63 IN | BODY MASS INDEX: 25.52 KG/M2

## 2019-06-22 DIAGNOSIS — M47.816 OSTEOARTHRITIS OF LUMBAR SPINE, UNSPECIFIED SPINAL OSTEOARTHRITIS COMPLICATION STATUS: ICD-10-CM

## 2019-06-22 DIAGNOSIS — M25.551 ACUTE RIGHT HIP PAIN: Primary | ICD-10-CM

## 2019-06-22 DIAGNOSIS — M16.11 OSTEOARTHRITIS OF RIGHT HIP, UNSPECIFIED OSTEOARTHRITIS TYPE: ICD-10-CM

## 2019-06-22 PROCEDURE — 73502 X-RAY EXAM HIP UNI 2-3 VIEWS: CPT

## 2019-06-22 PROCEDURE — 72110 X-RAY EXAM L-2 SPINE 4/>VWS: CPT

## 2019-06-22 PROCEDURE — 99283 EMERGENCY DEPT VISIT LOW MDM: CPT

## 2019-06-22 PROCEDURE — 6360000002 HC RX W HCPCS: Performed by: NURSE PRACTITIONER

## 2019-06-22 PROCEDURE — 96372 THER/PROPH/DIAG INJ SC/IM: CPT

## 2019-06-22 PROCEDURE — 6370000000 HC RX 637 (ALT 250 FOR IP): Performed by: NURSE PRACTITIONER

## 2019-06-22 RX ORDER — HYDROCODONE BITARTRATE AND ACETAMINOPHEN 5; 325 MG/1; MG/1
1 TABLET ORAL EVERY 6 HOURS PRN
Qty: 12 TABLET | Refills: 0 | Status: SHIPPED | OUTPATIENT
Start: 2019-06-22 | End: 2019-06-25

## 2019-06-22 RX ORDER — HYDROCODONE BITARTRATE AND ACETAMINOPHEN 5; 325 MG/1; MG/1
1 TABLET ORAL ONCE
Status: COMPLETED | OUTPATIENT
Start: 2019-06-22 | End: 2019-06-22

## 2019-06-22 RX ORDER — KETOROLAC TROMETHAMINE 30 MG/ML
30 INJECTION, SOLUTION INTRAMUSCULAR; INTRAVENOUS ONCE
Status: COMPLETED | OUTPATIENT
Start: 2019-06-22 | End: 2019-06-22

## 2019-06-22 RX ORDER — METHYLPREDNISOLONE 4 MG/1
TABLET ORAL
Qty: 1 KIT | Refills: 0 | Status: SHIPPED | OUTPATIENT
Start: 2019-06-22 | End: 2019-06-28

## 2019-06-22 RX ADMIN — KETOROLAC TROMETHAMINE 30 MG: 30 INJECTION, SOLUTION INTRAMUSCULAR; INTRAVENOUS at 11:01

## 2019-06-22 RX ADMIN — HYDROCODONE BITARTRATE AND ACETAMINOPHEN 1 TABLET: 5; 325 TABLET ORAL at 13:18

## 2019-06-22 ASSESSMENT — PAIN DESCRIPTION - PAIN TYPE: TYPE: ACUTE PAIN

## 2019-06-22 ASSESSMENT — PAIN SCALES - GENERAL
PAINLEVEL_OUTOF10: 9

## 2019-06-22 ASSESSMENT — PAIN DESCRIPTION - ORIENTATION: ORIENTATION: RIGHT

## 2019-06-22 ASSESSMENT — PAIN DESCRIPTION - LOCATION: LOCATION: HIP

## 2019-06-22 NOTE — ED PROVIDER NOTES
[facility]     Department of Emergency Medicine   ED  Provider Note  Admit Date/RoomTime: 6/22/2019 10:11 AM  ED Room: MAGDALENA/MAGDALENA   Chief Complaint   Hip Pain (right since thursday, denies injury, hx back pain / pain and burning radiates to right leg)    History of Present Illness   Source of history provided by:  patient. History/Exam Limitations: none. Ag Mcclain is a 66 y.o. old female who  has a past medical history of Arthritis, History of double vision, Hyperlipidemia, Hypertension, PONV (postoperative nausea and vomiting), Raynaud's disease, and TIA (transient ischemic attack). , presents to the emergency department by private vehicle, for right hip pain which occured several day(s) prior to arrival.  Mechanism of injury/pain was result of an unknown cause. Since onset the symptoms have been mild in degree. Her pain is aggraveated by movement and use, relieved by nothing tried and associated with low back pain that is chronic in nature. Tetanus Status: up to date. There has been no history of head injury, loss of consciousness, neck pain, chest pain, abdominal pain, extremity injury, numbness or weakness. ROS    Pertinent positives and negatives are stated within HPI, all other systems reviewed and are negative. Past Surgical History:   Procedure Laterality Date    APPENDECTOMY      COLONOSCOPY  09/26/2017    EYE SURGERY Bilateral     cataract removal w lense implants    LAPAROSCOPY  1997    ENDOMETRIOSIS    UPPER GASTROINTESTINAL ENDOSCOPY  09/26/2017   Social History:  reports that she has never smoked. She has never used smokeless tobacco. She reports that she does not drink alcohol or use drugs. Family History: family history includes Cancer in her mother; Heart Attack (age of onset: 52) in her father; Heart Disease in her father. Allergies: Patient has no known allergies.     Physical Exam           ED Triage Vitals [06/22/19 1009]   BP Temp Temp Source Pulse Resp SpO2 Height degenerative changes        ED Course / Medical Decision Making     Medications   ketorolac (TORADOL) injection 30 mg (30 mg Intramuscular Given 6/22/19 1101)   HYDROcodone-acetaminophen (NORCO) 5-325 MG per tablet 1 tablet (1 tablet Oral Given 6/22/19 1318)        Re-examination:  6/22/19       Time: 1500   Symptoms  have improved with treatment. Consult(s):   None    Procedure(s):   none    MDM:   At this time the patient is without objective evidence of an acute process requiring hospitalization or inpatient management. They have remained hemodynamically stable throughout their entire ED visit and are stable for discharge with outpatient follow-up. The plan has been discussed in detail and they are aware of the specific conditions for emergent return, as well as the importance of follow-up. Counseling: The emergency provider has spoken with the patient and discussed todays results, in addition to providing specific details for the plan of care and counseling regarding the diagnosis and prognosis. Questions are answered at this time and they are agreeable with the plan. Assessment      1. Acute right hip pain    2. Osteoarthritis of right hip, unspecified osteoarthritis type    3. Osteoarthritis of lumbar spine, unspecified spinal osteoarthritis complication status      Plan   Discharge to home  Patient condition is good    New Medications     Discharge Medication List as of 6/22/2019 12:35 PM      START taking these medications    Details   HYDROcodone-acetaminophen (NORCO) 5-325 MG per tablet Take 1 tablet by mouth every 6 hours as needed for Pain for up to 3 days. , Disp-12 tablet, R-0Print      methylPREDNISolone (MEDROL, ENEIDA,) 4 MG tablet Take by mouth., Disp-1 kit, R-0Print           Electronically signed by KIM Ballard CNP   DD: 6/22/19  **This report was transcribed using voice recognition software.  Every effort was made to ensure accuracy; however, inadvertent computerized transcription errors may be present.   END OF ED PROVIDER NOTE        Marco Barbosa, KIM - JEANNIE  06/22/19 [id number]

## 2019-06-22 NOTE — ED NOTES
Discharge instructions given, medications and follow up instructions reviewed. Patient verbalized understanding, no other noted or stated problems at this time. Patient will follow up with physicians as directed.         Nicolas Ken RN  06/22/19 [id number]

## 2019-06-29 ENCOUNTER — HOSPITAL ENCOUNTER (EMERGENCY)
Age: 79
Discharge: HOME | End: 2019-06-29
Payer: MEDICARE

## 2019-06-29 VITALS
RESPIRATION RATE: 14 BRPM | TEMPERATURE: 97.4 F | WEIGHT: 144 LBS | OXYGEN SATURATION: 97 % | SYSTOLIC BLOOD PRESSURE: 140 MMHG | BODY MASS INDEX: 25.52 KG/M2 | HEART RATE: 119 BPM | DIASTOLIC BLOOD PRESSURE: 66 MMHG | HEIGHT: 63 IN

## 2019-06-29 DIAGNOSIS — M25.551 RIGHT HIP PAIN: Primary | ICD-10-CM

## 2019-06-29 DIAGNOSIS — M16.11 OSTEOARTHRITIS OF RIGHT HIP, UNSPECIFIED OSTEOARTHRITIS TYPE: ICD-10-CM

## 2019-06-29 PROCEDURE — 6360000002 HC RX W HCPCS: Performed by: PHYSICIAN ASSISTANT

## 2019-06-29 PROCEDURE — 96372 THER/PROPH/DIAG INJ SC/IM: CPT

## 2019-06-29 PROCEDURE — 99283 EMERGENCY DEPT VISIT LOW MDM: CPT

## 2019-06-29 RX ORDER — OXYCODONE HYDROCHLORIDE AND ACETAMINOPHEN 5; 325 MG/1; MG/1
.5-1 TABLET ORAL EVERY 4 HOURS PRN
Qty: 12 TABLET | Refills: 0 | Status: SHIPPED | OUTPATIENT
Start: 2019-06-29 | End: 2019-07-02

## 2019-06-29 RX ORDER — MORPHINE SULFATE 2 MG/ML
2 INJECTION, SOLUTION INTRAMUSCULAR; INTRAVENOUS ONCE
Status: COMPLETED | OUTPATIENT
Start: 2019-06-29 | End: 2019-06-29

## 2019-06-29 RX ADMIN — MORPHINE SULFATE 2 MG: 2 INJECTION, SOLUTION INTRAMUSCULAR; INTRAVENOUS at 14:37

## 2019-06-29 ASSESSMENT — PAIN DESCRIPTION - PROGRESSION: CLINICAL_PROGRESSION: GRADUALLY WORSENING

## 2019-06-29 ASSESSMENT — PAIN DESCRIPTION - ORIENTATION: ORIENTATION: RIGHT

## 2019-06-29 ASSESSMENT — PAIN DESCRIPTION - DESCRIPTORS: DESCRIPTORS: ACHING;SHARP

## 2019-06-29 ASSESSMENT — PAIN DESCRIPTION - PAIN TYPE: TYPE: ACUTE PAIN

## 2019-06-29 ASSESSMENT — PAIN SCALES - GENERAL: PAINLEVEL_OUTOF10: 10

## 2019-06-29 ASSESSMENT — PAIN DESCRIPTION - FREQUENCY: FREQUENCY: CONTINUOUS

## 2019-06-29 ASSESSMENT — PAIN DESCRIPTION - ONSET: ONSET: ON-GOING

## 2019-06-29 ASSESSMENT — PAIN DESCRIPTION - LOCATION: LOCATION: HIP

## 2019-06-29 NOTE — ED PROVIDER NOTES
answered at this time and they are agreeable with the plan. Patient understands they must follow-up with PCP and/or orthopedics. RICE discussed. They were educated on signs and symptoms that would require emergent return. Patient was educated on newly prescribed medications. Patient states she does have an ambulatory device at home that she can use to help her around the house. Assessment      1. Right hip pain    2. Osteoarthritis of right hip, unspecified osteoarthritis type      Plan   Discharge to home  Patient condition is good    New Medications     Discharge Medication List as of 6/29/2019  2:52 PM      START taking these medications    Details   oxyCODONE-acetaminophen (PERCOCET) 5-325 MG per tablet Take 0.5-1 tablets by mouth every 4 hours as needed for Pain for up to 3 days. , Disp-12 tablet, R-0Print             Electronically signed by Selene Lindsey PA-C   DD: 6/29/19  **This report was transcribed using voice recognition software. Every effort was made to ensure accuracy; however, inadvertent computerized transcription errors may be present.     END OF ED PROVIDER NOTE      Selene Lindsey PA-C  06/29/19 [id number]

## 2019-07-03 ENCOUNTER — OFFICE VISIT (OUTPATIENT)
Dept: ORTHOPEDIC SURGERY | Age: 79
End: 2019-07-03
Payer: MEDICARE

## 2019-07-03 VITALS
SYSTOLIC BLOOD PRESSURE: 140 MMHG | HEART RATE: 100 BPM | TEMPERATURE: 98.6 F | DIASTOLIC BLOOD PRESSURE: 87 MMHG | WEIGHT: 144 LBS | BODY MASS INDEX: 25.52 KG/M2 | HEIGHT: 63 IN

## 2019-07-03 DIAGNOSIS — M70.61 TROCHANTERIC BURSITIS, RIGHT HIP: Primary | ICD-10-CM

## 2019-07-03 DIAGNOSIS — M76.01 GLUTEAL TENDINITIS, RIGHT HIP: ICD-10-CM

## 2019-07-03 PROCEDURE — G8419 CALC BMI OUT NRM PARAM NOF/U: HCPCS | Performed by: ORTHOPAEDIC SURGERY

## 2019-07-03 PROCEDURE — 99213 OFFICE O/P EST LOW 20 MIN: CPT | Performed by: ORTHOPAEDIC SURGERY

## 2019-07-03 PROCEDURE — 1036F TOBACCO NON-USER: CPT | Performed by: ORTHOPAEDIC SURGERY

## 2019-07-03 PROCEDURE — G8399 PT W/DXA RESULTS DOCUMENT: HCPCS | Performed by: ORTHOPAEDIC SURGERY

## 2019-07-03 PROCEDURE — 1123F ACP DISCUSS/DSCN MKR DOCD: CPT | Performed by: ORTHOPAEDIC SURGERY

## 2019-07-03 PROCEDURE — 20610 DRAIN/INJ JOINT/BURSA W/O US: CPT | Performed by: ORTHOPAEDIC SURGERY

## 2019-07-03 PROCEDURE — G8427 DOCREV CUR MEDS BY ELIG CLIN: HCPCS | Performed by: ORTHOPAEDIC SURGERY

## 2019-07-03 PROCEDURE — 1090F PRES/ABSN URINE INCON ASSESS: CPT | Performed by: ORTHOPAEDIC SURGERY

## 2019-07-03 PROCEDURE — 4040F PNEUMOC VAC/ADMIN/RCVD: CPT | Performed by: ORTHOPAEDIC SURGERY

## 2019-07-03 RX ORDER — TRIAMCINOLONE ACETONIDE 40 MG/ML
40 INJECTION, SUSPENSION INTRA-ARTICULAR; INTRAMUSCULAR ONCE
Status: COMPLETED | OUTPATIENT
Start: 2019-07-03 | End: 2019-07-03

## 2019-07-03 RX ORDER — OXYCODONE HYDROCHLORIDE AND ACETAMINOPHEN 5; 325 MG/1; MG/1
1 TABLET ORAL EVERY 4 HOURS PRN
COMMUNITY
End: 2019-08-19

## 2019-07-03 RX ORDER — LIDOCAINE HYDROCHLORIDE 10 MG/ML
2 INJECTION, SOLUTION INFILTRATION; PERINEURAL ONCE
Status: COMPLETED | OUTPATIENT
Start: 2019-07-03 | End: 2019-07-03

## 2019-07-03 RX ADMIN — LIDOCAINE HYDROCHLORIDE 2 ML: 10 INJECTION, SOLUTION INFILTRATION; PERINEURAL at 16:03

## 2019-07-03 RX ADMIN — TRIAMCINOLONE ACETONIDE 40 MG: 40 INJECTION, SUSPENSION INTRA-ARTICULAR; INTRAMUSCULAR at 16:04

## 2019-07-04 NOTE — PROGRESS NOTES
organizations: None     Relationship status: None    Intimate partner violence:     Fear of current or ex partner: None     Emotionally abused: None     Physically abused: None     Forced sexual activity: None   Other Topics Concern    None   Social History Narrative    None       Family History   Problem Relation Age of Onset    Cancer Mother     Heart Disease Father     Heart Attack Father 52         MI         Review of Systems   No fever, chills, or other constitutionalsymptoms. No numbness or other neuro symptoms. [unfilled]   Overall she is objectively appears more comfortable than her subjective complaints. Patient stands and walks independently with minimal antalgic gait affected by the right hip. Leg lengths are clinically equal.  She is tender to palpation of the greater trochanter and hip abductors on the right. Joint pain is not reproduced with full right hip rotation. Mild discomfort objectively with resisted hip flexion, mild discomfort with resisted hip abduction. Strength measurement appears limited by her effort but is at least 4+/5. Physical Exam    Patient is alert and oriented. Well-developed well-nourished. BMI 25.5  Pupils equal and reactive. Scleraeanicteric. Neck supple  Lungs clear. Cardiac rate and rhythm regular. Abdomen soft and nontender. Skin warm and dry. XRAY: Hospital x-rays pelvis and right hip reviewed by me. There are mild degenerative changes right hip some joint space narrowing the joint space does remain. Slight osteophyte and sclerosis femoral head is intact. No bone lesion seen. Impression: Radiographically mild osteoarthritis right hip. ASSESSMENT/PLAN:    Zaira June was seen today for hip pain.     Diagnoses and all orders for this visit:    Trochanteric bursitis, right hip  -     [place] ARTHROCENTESIS ASPIR&/INJ MAJOR JT/BURSA W/O US  -     Ambulatory referral to Physical Therapy    Gluteal tendinitis, right hip  - MD    7/4/2019  2:36 PM

## 2019-07-08 ENCOUNTER — OFFICE VISIT (OUTPATIENT)
Dept: PAIN MANAGEMENT | Age: 79
End: 2019-07-08
Payer: MEDICARE

## 2019-07-08 VITALS
DIASTOLIC BLOOD PRESSURE: 68 MMHG | OXYGEN SATURATION: 96 % | RESPIRATION RATE: 18 BRPM | HEART RATE: 112 BPM | TEMPERATURE: 98.2 F | BODY MASS INDEX: 25.52 KG/M2 | HEIGHT: 63 IN | SYSTOLIC BLOOD PRESSURE: 124 MMHG | WEIGHT: 144 LBS

## 2019-07-08 DIAGNOSIS — M16.11 PRIMARY OSTEOARTHRITIS OF RIGHT HIP: ICD-10-CM

## 2019-07-08 DIAGNOSIS — M54.50 CHRONIC RIGHT-SIDED LOW BACK PAIN WITHOUT SCIATICA: ICD-10-CM

## 2019-07-08 DIAGNOSIS — M51.36 BULGING LUMBAR DISC: ICD-10-CM

## 2019-07-08 DIAGNOSIS — M47.816 FACET DEGENERATION OF LUMBAR REGION: ICD-10-CM

## 2019-07-08 DIAGNOSIS — M48.061 SPINAL STENOSIS OF LUMBAR REGION WITHOUT NEUROGENIC CLAUDICATION: ICD-10-CM

## 2019-07-08 DIAGNOSIS — G89.29 CHRONIC RIGHT-SIDED LOW BACK PAIN WITHOUT SCIATICA: ICD-10-CM

## 2019-07-08 PROCEDURE — 99214 OFFICE O/P EST MOD 30 MIN: CPT | Performed by: PHYSICIAN ASSISTANT

## 2019-07-08 PROCEDURE — G8399 PT W/DXA RESULTS DOCUMENT: HCPCS | Performed by: PHYSICIAN ASSISTANT

## 2019-07-08 PROCEDURE — 1090F PRES/ABSN URINE INCON ASSESS: CPT | Performed by: PHYSICIAN ASSISTANT

## 2019-07-08 PROCEDURE — G8427 DOCREV CUR MEDS BY ELIG CLIN: HCPCS | Performed by: PHYSICIAN ASSISTANT

## 2019-07-08 PROCEDURE — 1123F ACP DISCUSS/DSCN MKR DOCD: CPT | Performed by: PHYSICIAN ASSISTANT

## 2019-07-08 PROCEDURE — 4040F PNEUMOC VAC/ADMIN/RCVD: CPT | Performed by: PHYSICIAN ASSISTANT

## 2019-07-08 PROCEDURE — G0444 DEPRESSION SCREEN ANNUAL: HCPCS | Performed by: PHYSICIAN ASSISTANT

## 2019-07-08 PROCEDURE — G8419 CALC BMI OUT NRM PARAM NOF/U: HCPCS | Performed by: PHYSICIAN ASSISTANT

## 2019-07-08 PROCEDURE — 1036F TOBACCO NON-USER: CPT | Performed by: PHYSICIAN ASSISTANT

## 2019-07-08 RX ORDER — TIZANIDINE 4 MG/1
2 TABLET ORAL 2 TIMES DAILY PRN
Qty: 20 TABLET | Refills: 0 | Status: SHIPPED | OUTPATIENT
Start: 2019-07-08 | End: 2019-07-18

## 2019-07-08 ASSESSMENT — PATIENT HEALTH QUESTIONNAIRE - PHQ9
8. MOVING OR SPEAKING SO SLOWLY THAT OTHER PEOPLE COULD HAVE NOTICED. OR THE OPPOSITE, BEING SO FIGETY OR RESTLESS THAT YOU HAVE BEEN MOVING AROUND A LOT MORE THAN USUAL: 0
7. TROUBLE CONCENTRATING ON THINGS, SUCH AS READING THE NEWSPAPER OR WATCHING TELEVISION: 0
SUM OF ALL RESPONSES TO PHQ9 QUESTIONS 1 & 2: 4
4. FEELING TIRED OR HAVING LITTLE ENERGY: 3
10. IF YOU CHECKED OFF ANY PROBLEMS, HOW DIFFICULT HAVE THESE PROBLEMS MADE IT FOR YOU TO DO YOUR WORK, TAKE CARE OF THINGS AT HOME, OR GET ALONG WITH OTHER PEOPLE: 2
2. FEELING DOWN, DEPRESSED OR HOPELESS: 1
SUM OF ALL RESPONSES TO PHQ QUESTIONS 1-9: 13
SUM OF ALL RESPONSES TO PHQ QUESTIONS 1-9: 13
1. LITTLE INTEREST OR PLEASURE IN DOING THINGS: 3
3. TROUBLE FALLING OR STAYING ASLEEP: 3
6. FEELING BAD ABOUT YOURSELF - OR THAT YOU ARE A FAILURE OR HAVE LET YOURSELF OR YOUR FAMILY DOWN: 0
9. THOUGHTS THAT YOU WOULD BE BETTER OFF DEAD, OR OF HURTING YOURSELF: 0
5. POOR APPETITE OR OVEREATING: 3

## 2019-07-08 NOTE — PROGRESS NOTES
left  SLR:positive right, negative left, sitting     Extremities:    Tremors:None bilaterally upper and lower  Range of motion:Generally normal shoulders, pain with internal rotation of hips negative. Negative heel tap exam bilaterally. Intact:Yes  Varicose veins:absent   Pulses:radial pulse 2+ left  Cyanosis:none  Edema:Normal      Neurological:    Cranial nerves:normal  Sensory:normal to joint position sense and light touch   Motor:         Right Quadriceps5/5          Left Quadriceps5/5           Right Gastrocnemius5/5    Left Gastrocnemius5/5  Right Ant Tibialis5/5  Left Ant Tibialis5/5  Coordination:normal  Reflexes:    Right Quadriceps reflex2+  Left Quadriceps reflex2+  Right Achilles reflex2+  Left Achilles reflex2+  Gait:antalgic without an assistive device    Dermatology:    Skin:no unusual rashes, no skin lesions, no palpable subcutaneous nodules and good skin turgor    Assessment/Plan:    66year old F with a long history of lower back pain with recent 2 week episode of right hip/groin pain  Axial lumbar spine pain on exam  Right hip x-ray - moderate degenerative changes  Lumbar spine x-ray - moderate facet degeneration  MRI lumbar spine 2015 - Chronic compression fxs L1-L2, DDD, stenosis, facet arthropathy  LESI - series of 3 by Dr. Beto Toro 1 year ago. Relief until recently. They were unable to get her in and wishes to stay with [facility] as she used to work at [id number]. Seen in ED x 2 recently    Saw Dr. Sarah Nascimento recently - right GT hip injection with no relief. No pain on log roll or heel tap exam today on exam.    Percocet and norco from ED provided 1 hour relief. Patient not interested in any type of opioid therapy. Zanaflex 2 mg BID prn - ordered today. ZTLido patch sample given today to assist with her pain needs  MRI lumbar spine without contrast ordered today to further evaluate for potential LESI in the future and to r/o compression fracture. Last MRI 2015. Hold off UDS.   No

## 2019-07-09 ENCOUNTER — HOSPITAL ENCOUNTER (OUTPATIENT)
Dept: MRI IMAGING | Age: 79
Discharge: HOME | End: 2019-07-11
Payer: MEDICARE

## 2019-07-09 DIAGNOSIS — G89.29 CHRONIC RIGHT-SIDED LOW BACK PAIN WITHOUT SCIATICA: ICD-10-CM

## 2019-07-09 DIAGNOSIS — M54.50 CHRONIC RIGHT-SIDED LOW BACK PAIN WITHOUT SCIATICA: ICD-10-CM

## 2019-07-09 PROCEDURE — 72148 MRI LUMBAR SPINE W/O DYE: CPT

## 2019-07-11 ENCOUNTER — TELEPHONE (OUTPATIENT)
Dept: PAIN MANAGEMENT | Age: 79
End: 2019-07-11

## 2019-07-11 NOTE — TELEPHONE ENCOUNTER
I spoke with Aniket Muhammad and gave her the MRI results. She wants to try one more dose of zanaflex tomorrow morning to see if it was actually the medication causing the nausea. If it does, she will discontinue and we can try another muscle relaxer. She will call in. Okay for PT, but she would like to hold off due to pain. Trish Gross, please call her to schedule her to get in with Dr. Ashleigh Milner as soon as she is available to discussed LESI and then cancel the follow up that she has with me. Thank you!

## 2019-07-12 ENCOUNTER — TELEPHONE (OUTPATIENT)
Dept: PAIN MANAGEMENT | Age: 79
End: 2019-07-12

## 2019-07-23 ENCOUNTER — OFFICE VISIT (OUTPATIENT)
Dept: PAIN MANAGEMENT | Age: 79
End: 2019-07-23
Payer: MEDICARE

## 2019-07-23 ENCOUNTER — PREP FOR PROCEDURE (OUTPATIENT)
Dept: PAIN MANAGEMENT | Age: 79
End: 2019-07-23

## 2019-07-23 VITALS
DIASTOLIC BLOOD PRESSURE: 78 MMHG | RESPIRATION RATE: 18 BRPM | BODY MASS INDEX: 24.45 KG/M2 | HEART RATE: 78 BPM | WEIGHT: 138 LBS | HEIGHT: 63 IN | OXYGEN SATURATION: 98 % | SYSTOLIC BLOOD PRESSURE: 132 MMHG | TEMPERATURE: 98.6 F

## 2019-07-23 DIAGNOSIS — G89.4 CHRONIC PAIN SYNDROME: Primary | ICD-10-CM

## 2019-07-23 DIAGNOSIS — M54.41 CHRONIC RIGHT-SIDED LOW BACK PAIN WITH RIGHT-SIDED SCIATICA: ICD-10-CM

## 2019-07-23 DIAGNOSIS — G89.29 CHRONIC RIGHT-SIDED LOW BACK PAIN WITH RIGHT-SIDED SCIATICA: ICD-10-CM

## 2019-07-23 DIAGNOSIS — M51.36 BULGING LUMBAR DISC: ICD-10-CM

## 2019-07-23 DIAGNOSIS — M54.16 LUMBAR RADICULOPATHY: Primary | ICD-10-CM

## 2019-07-23 DIAGNOSIS — M54.16 LUMBAR RADICULOPATHY: ICD-10-CM

## 2019-07-23 PROCEDURE — G8420 CALC BMI NORM PARAMETERS: HCPCS | Performed by: PAIN MEDICINE

## 2019-07-23 PROCEDURE — 1123F ACP DISCUSS/DSCN MKR DOCD: CPT | Performed by: PAIN MEDICINE

## 2019-07-23 PROCEDURE — 1036F TOBACCO NON-USER: CPT | Performed by: PAIN MEDICINE

## 2019-07-23 PROCEDURE — 4040F PNEUMOC VAC/ADMIN/RCVD: CPT | Performed by: PAIN MEDICINE

## 2019-07-23 PROCEDURE — G8399 PT W/DXA RESULTS DOCUMENT: HCPCS | Performed by: PAIN MEDICINE

## 2019-07-23 PROCEDURE — 99214 OFFICE O/P EST MOD 30 MIN: CPT | Performed by: PAIN MEDICINE

## 2019-07-23 PROCEDURE — 1090F PRES/ABSN URINE INCON ASSESS: CPT | Performed by: PAIN MEDICINE

## 2019-07-23 PROCEDURE — G8427 DOCREV CUR MEDS BY ELIG CLIN: HCPCS | Performed by: PAIN MEDICINE

## 2019-07-23 RX ORDER — AMITRIPTYLINE HYDROCHLORIDE 10 MG/1
10 TABLET, FILM COATED ORAL NIGHTLY
Qty: 30 TABLET | Refills: 0 | Status: SHIPPED | OUTPATIENT
Start: 2019-07-23 | End: 2019-08-19 | Stop reason: SDUPTHER

## 2019-07-23 NOTE — PROGRESS NOTES
IMAGES:  5 views   INDICATION:  worsening Chronic low back pain    worsening Chronic low back pain   COMPARISON: None           Alignment of the vertebral bodies appears to be near-anatomic   No fracture or foreign body is identified. The disc spaces demonstrate severe degenerative changes. There is mild loss of the vertebral body height   The are severe degenerative changes involving the facets.           Impression   Findings consistent with degenerative disc disease and   degenerative facets disease      08/21/2015 MRI lumbar spine     IMPRESSION:    1.  Mild chronic L1 and L2 compression fracture deformities.  No evidence   of an acute fracture. 2.  Diffuse spondylosis most pronounced at the L1-2 and L2-3 levels. Transcribe Date/Time: Aug 21 2015  2:26P    Dictated by : Georgiana Marsh MD    This examination was interpreted and the report reviewed and   electronically signed by: Georgiana Marsh MD On Aug 21 2015  2:26PM    Result Narrative   Final Report    Physician Interpretation     HISTORY: Chronic low back pain with occasional numbness of the left lower   extremity which has recently progressed. TECHNIQUE: Routine noncontrast lumbar spine MRI was performed.  There are   no previous studies available for comparison. RESULT: Counting reference: Lumbosacral junction.  For the purposes of   this report, L4-5 is considered the level of the iliac crest.    There are mild chronic L1 and L2 compression fracture deformities.  No   significant bony retropulsion.  No evidence of an acute fracture.  There   is a mild levoscoliosis centered at approximately L3-4.  There is disc   desiccation of varying degrees throughout the lumbar spine.  There is   mild disc space narrowing at L2-3.  There are mild diffuse endplate   degenerative changes most pronounced at the L2-3 level.  The conus   medullaris has a normal appearance and terminates at approximately L2.    T11-12:  There is a small left paracentral disc extrusion with caudad   migration.  There is mild facet hypertrophy.  No significant central   canal or foraminal stenosis. T12-L1: There is a small left paracentral disc protrusion indenting the   thecal sac.  There is mild left-sided facet hypertrophy.  No significant   central canal or foraminal stenosis. L1-2: There is a right paracentral disc extrusion with caudad migration.    There is mild facet hypertrophy.  There is mild central canal stenosis.    No significant foraminal stenosis. L2-3: There is mild central canal stenosis secondary to an annular disc   bulge, facet and ligamentum flavum hypertrophy.  There is mild bilateral   foraminal stenosis. L3-4: There is a minor disc bulge and mild facet hypertrophy.  The   central canal is maintained. There is mild bilateral foraminal stenosis. L4-5: There is an annular disc bulge mildly compressing the thecal sac.    There is mild facet and ligamentum flavum hypertrophy.  The central canal   is maintained.  There is mild to moderate right and mild left foraminal   stenosis. L5-S1: There is a disc bulge with a right paracentral annular fissure.    No focal disc herniation.  There is mild to moderate facet hypertrophy.    The central canal is maintained.  There is mild bilateral foraminal   stenosis.     Prevertebral/paraspinal soft tissues are unremarkable.  There is an   incompletely imaged probable cyst at the midpole of the left kidney.  It   measures approximately 1.7 cm in diameter.  Confirmation with renal   sonogram may be helpful as clinically warranted.        Potential Aberrant Drug-Related Behavior: no     Urine Drug Screening:    OARRS report:  7/2019 consistent    Past Medical History:   Diagnosis Date    Arthritis     History of double vision     both  eyes    Hyperlipidemia     Hypertension     Joint pain     Low back pain     PONV (postoperative nausea and vomiting)     Raynaud's disease     TIA (transient ischemic Number of children: Not on file    Years of education: Not on file    Highest education level: Not on file   Occupational History    Occupation: retired   Social Needs    Financial resource strain: Not on file    Food insecurity:     Worry: Not on file     Inability: Not on file   [employer] needs:     Medical: Not on file     Non-medical: Not on file   Tobacco Use    Smoking status: Never Smoker    Smokeless tobacco: Never Used   Substance and Sexual Activity    Alcohol use: No     Alcohol/week: 0.0 standard drinks    Drug use: No    Sexual activity: Not on file   Lifestyle    Physical activity:     Days per week: Not on file     Minutes per session: Not on file    Stress: Not on file   Relationships    Social connections:     Talks on phone: Not on file     Gets together: Not on file     Attends Zoroastrianism service: Not on file     Active member of club or organization: Not on file     Attends meetings of clubs or organizations: Not on file     Relationship status: Not on file    Intimate partner violence:     Fear of current or ex partner: Not on file     Emotionally abused: Not on file     Physically abused: Not on file     Forced sexual activity: Not on file   Other Topics Concern    Not on file   Social History Narrative    Not on file       Family History   Problem Relation Age of Onset    Cancer Mother     Heart Disease Father     Heart Attack Father 52         MI       REVIEW OF SYSTEMS:     [address] [place] [address] denies fever/chills, chest pain, shortness of breath, new bowel or bladder complaints. All other review of systems was negative. PHYSICAL EXAMINATION:      /78   Pulse 78   Temp 98.6 °F (37 °C)   Resp 18   Ht 5' 3\" (1.6 m)   Wt 138 lb (62.6 kg)   SpO2 98%   BMI 24.45 kg/m²     General:       General appearance: awake, alert, oriented, in no acute distress, well developed, well nourished,pleasant and well-hydrated.    Build:Normal Weight  Function:Rises from aseated position with difficulty        Head:    normocephalic and atraumatic  Pupils:regular, round and equal.  Sclera: icterus absent,      Lungs:     Breathing:Normal expansion.       Abdomen:     Shape:non-distended and normal  Tenderness:none  Guarding:none     Lumbar spine:     Spine inspection:normal   CVA tenderness:No   Palpation:tenderness paravertebral muscles, midline tenderness, facet loading, right and left negative. Range of motion:abnormal mildly in lateral bending, flexion, extension rotation and is painful.     Musculoskeletal:     SIjoint tenderness:positive right, negative left              INNA test:positive right, negative left  Piriformis tenderness:negative right, negative left  Trochanteric bursa tenderness:negative right, negative left  SLR:negative right, negative left, sitting      Extremities:     Tremors:None bilaterally upper and lower  Range of motion:pain with internal rotation of hips negative.   Intact:Yes, valgus deformity b/l knees  Varicose veins:absent   Pulses:radial pulse 2+ left  Cyanosis:none  Edema:Normal        Neurological:     Sensory:normal to joint position sense and light touch     Motor:         Right Quadriceps5/5          Left Quadriceps5/5           Right Gastrocnemius5/5    Left Gastrocnemius5/5  Right Ant Tibialis5/5  Left Ant Tibialis5/5    Reflexes:    Right Quadriceps reflex2+  Left Quadriceps reflex2+  Right Achilles reflex2+  Left Achilles reflex2+  Gait:normal station     Dermatology:     Skin:no unusual rashes, no skin lesions     Assessment/Plan:     66year old F with a long history of lower back pain with recent 1 month episode of right hip/groin pain, also with pain in region of rt SIJ  Right hip x-ray - moderate degenerative changes, per Dr. Luis Enrique Laureano note his OA is mild  Lumbar spine x-ray - moderate facet degeneration  MRI lumbar spine 2015 - Chronic compression fxs L1-L2, DDD, stenosis, facet arthropathy  MRI lumbar spine 2019 - disc bulge toward the

## 2019-07-23 NOTE — PROGRESS NOTES
Yen Leger presents to the [facility] on 7/23/2019. Janna Roman is complaining of pain in the back around the hip to the fight thigh . The pain is constant. The pain is described as aching, throbbing, shooting and burning. Pain is rated on her best day at a 10, on her worst day at a 10, and on average at a 10 on the VAS scale. She took her last dose of Tylenol daily   Any procedures since your last visit: No,     Pacemaker or defibrilator: No managed by none . She has been on anticoagulation medications to include Plavix and is managed by . Tima Castle MD    .      /78   Pulse 78   Temp 98.6 °F (37 °C)   Resp 18   Ht 5' 3\" (1.6 m)   Wt 138 lb (62.6 kg)   SpO2 98%   BMI 24.45 kg/m²      No LMP recorded.  Patient is postmenopausal.

## 2019-08-02 ENCOUNTER — TELEPHONE (OUTPATIENT)
Dept: PAIN MANAGEMENT | Age: 79
End: 2019-08-02

## 2019-08-19 ENCOUNTER — PREP FOR PROCEDURE (OUTPATIENT)
Dept: PAIN MANAGEMENT | Age: 79
End: 2019-08-19

## 2019-08-19 ENCOUNTER — OFFICE VISIT (OUTPATIENT)
Dept: PAIN MANAGEMENT | Age: 79
End: 2019-08-19
Payer: COMMERCIAL

## 2019-08-19 VITALS
HEIGHT: 63 IN | DIASTOLIC BLOOD PRESSURE: 72 MMHG | HEART RATE: 102 BPM | RESPIRATION RATE: 18 BRPM | BODY MASS INDEX: 24.63 KG/M2 | WEIGHT: 139 LBS | OXYGEN SATURATION: 99 % | TEMPERATURE: 98.4 F | SYSTOLIC BLOOD PRESSURE: 128 MMHG

## 2019-08-19 DIAGNOSIS — Z79.02 PLATELET INHIBITION DUE TO PLAVIX: ICD-10-CM

## 2019-08-19 DIAGNOSIS — M53.3 SACROILIAC DYSFUNCTION: Primary | ICD-10-CM

## 2019-08-19 DIAGNOSIS — M51.36 BULGING LUMBAR DISC: ICD-10-CM

## 2019-08-19 DIAGNOSIS — M51.36 DDD (DEGENERATIVE DISC DISEASE), LUMBAR: ICD-10-CM

## 2019-08-19 DIAGNOSIS — M47.817 LUMBOSACRAL SPONDYLOSIS WITHOUT MYELOPATHY: ICD-10-CM

## 2019-08-19 DIAGNOSIS — M54.16 LUMBAR RADICULOPATHY: ICD-10-CM

## 2019-08-19 DIAGNOSIS — G89.29 CHRONIC RIGHT-SIDED LOW BACK PAIN WITH RIGHT-SIDED SCIATICA: ICD-10-CM

## 2019-08-19 DIAGNOSIS — M54.41 CHRONIC RIGHT-SIDED LOW BACK PAIN WITH RIGHT-SIDED SCIATICA: ICD-10-CM

## 2019-08-19 DIAGNOSIS — G89.4 CHRONIC PAIN SYNDROME: ICD-10-CM

## 2019-08-19 PROCEDURE — 99215 OFFICE O/P EST HI 40 MIN: CPT | Performed by: ANESTHESIOLOGY

## 2019-08-19 RX ORDER — AMITRIPTYLINE HYDROCHLORIDE 10 MG/1
10 TABLET, FILM COATED ORAL NIGHTLY
Qty: 30 TABLET | Refills: 2 | Status: SHIPPED
Start: 2019-08-19 | End: 2020-05-19 | Stop reason: SDUPTHER

## 2019-08-19 NOTE — PROGRESS NOTES
[facility]        [address] [facility], [address]  Dept: [phone]        Follow up Note      Kj Meng     Date of Visit:  2019    CC:  Patient presents for follow up   Chief Complaint   Patient presents with    Pain     l9ower back and right side        HPI:  Patient of Dr. Nahum Palmer, here for follow up appointment and re-establish care with me for continued care. H/o Low back pain - increased since 2019. Pain mainly in the low back bilaterally and feels pain over the right thigh/ groin. She had trochanteric bursa injection by Dr. Jimbo Dill from ortho. She has seen Dr. Nahum Palmer and was recommended LESI But patient had to cancel the procedure since she was still taking Plavix. She has h/o TIA and takes Plavix for h/o TIA few years ago. No new weakness or numbness or bowel or bladder symptoms. Pain is slightly better then the last visit  Change in quality of symptoms:no. Medication side effects:none. Recent interventional procedures:none. .    She has been on anticoagulation medications to include Plavix. The patient  has not been on herbal supplements. The patient is not diabetic. Nursing notes and pain history and details reviewed. Please see nursing intake notes for details    Imaging studies:  Imagin/2019 lumbar MRI:  1. Degenerative spondylotic changes. 2. T12-L1: Midline to left-sided disc protrusion encroaching on the   left lateral recess. 3. L1-2: Right lateral to far lateral disc protrusion encroaching   moderately on the right lateral recess and also encroaching on the   right intervertebral foramen mildly crowding the exiting right L1   nerve root.    4. L2-3: Disc bulging with mild to moderate canalicular stenosis and   also encroachment on the right intervertebral foramen mildly crowding   the exiting right L2 nerve root.      2019 Right hip x-ray     Patient MRN:  [id number]   : 1940   Age: 66 years Inability: Not on file    Transportation needs:     Medical: Not on file     Non-medical: Not on file   Tobacco Use    Smoking status: Never Smoker    Smokeless tobacco: Never Used   Substance and Sexual Activity    Alcohol use: No     Alcohol/week: 0.0 standard drinks    Drug use: No    Sexual activity: Not on file   Lifestyle    Physical activity:     Days per week: Not on file     Minutes per session: Not on file    Stress: Not on file   Relationships    Social connections:     Talks on phone: Not on file     Gets together: Not on file     Attends Anabaptism service: Not on file     Active member of club or organization: Not on file     Attends meetings of clubs or organizations: Not on file     Relationship status: Not on file    Intimate partner violence:     Fear of current or ex partner: Not on file     Emotionally abused: Not on file     Physically abused: Not on file     Forced sexual activity: Not on file   Other Topics Concern    Not on file   Social History Narrative    Not on file       Family History   Problem Relation Age of Onset    Cancer Mother     Heart Disease Father     Heart Attack Father 52         MI       REVIEW OF SYSTEMS:     Mulu Johnson denies fever/chills, chest pain, shortness of breath, new bowel or bladder complaints. All other review of systems was negative. PHYSICAL EXAMINATION:      /72   Pulse 102   Temp 98.4 °F (36.9 °C)   Resp 18   Ht 5' 3\" (1.6 m)   Wt 139 lb (63 kg)   SpO2 99%   BMI 24.62 kg/m²      General:       General appearance: awake, alert, oriented, in no acute distress, well developed, well nourished,pleasant and well-hydrated.    Build:Normal Weight  Function:Rises from seated position with difficulty        Head:     normocephalic and atraumatic  Pupils:regular, round and equal.  Sclera: icterus absent,      Lungs:     Breathing:Normal expansion.       Abdomen:     Shape:non-distended and trial.    Does not want narcotics. Was started on Elavil 10 mg HS. Helping her well. Patient wants another sessions of PT. PT script given. Wants to address the SIJ pain first.    Schedule for b/l SIJ injection under fluoroscopy. RBA discussed. Can continue Plavix for SIJ injection. If her radicular pain persists, will consider LESI L1/2 or L2-3 (paramedian approach)- but for this she needs to be off Plavix for 7 days prior. Continue HEP. Refill elavil 10 mg Q hs # 30 with X 2 refill. Image findings/ Procedure details and medication use and side effects explained.       Jorge Wesley MD     CC:  Valarie Garcia MD

## 2019-09-12 ENCOUNTER — HOSPITAL ENCOUNTER (OUTPATIENT)
Age: 79
Setting detail: OUTPATIENT SURGERY
Discharge: HOME | End: 2019-09-12
Attending: ANESTHESIOLOGY | Admitting: ANESTHESIOLOGY
Payer: MEDICARE

## 2019-09-12 ENCOUNTER — HOSPITAL ENCOUNTER (OUTPATIENT)
Dept: GENERAL RADIOLOGY | Age: 79
Setting detail: OUTPATIENT SURGERY
Discharge: HOME | End: 2019-09-14
Attending: ANESTHESIOLOGY
Payer: MEDICARE

## 2019-09-12 VITALS
RESPIRATION RATE: 20 BRPM | OXYGEN SATURATION: 100 % | DIASTOLIC BLOOD PRESSURE: 80 MMHG | SYSTOLIC BLOOD PRESSURE: 133 MMHG | HEART RATE: 96 BPM | BODY MASS INDEX: 24.63 KG/M2 | TEMPERATURE: 97.4 F | HEIGHT: 63 IN | WEIGHT: 139 LBS

## 2019-09-12 DIAGNOSIS — R52 PAIN MANAGEMENT: ICD-10-CM

## 2019-09-12 PROCEDURE — 3209999900 FLUORO FOR SURGICAL PROCEDURES

## 2019-09-12 PROCEDURE — 7100000010 HC PHASE II RECOVERY - FIRST 15 MIN: Performed by: ANESTHESIOLOGY

## 2019-09-12 PROCEDURE — 27096 INJECT SACROILIAC JOINT: CPT | Performed by: ANESTHESIOLOGY

## 2019-09-12 PROCEDURE — 6360000002 HC RX W HCPCS: Performed by: ANESTHESIOLOGY

## 2019-09-12 PROCEDURE — 3600000002 HC SURGERY LEVEL 2 BASE: Performed by: ANESTHESIOLOGY

## 2019-09-12 PROCEDURE — 2500000003 HC RX 250 WO HCPCS: Performed by: ANESTHESIOLOGY

## 2019-09-12 PROCEDURE — 3600000012 HC SURGERY LEVEL 2 ADDTL 15MIN: Performed by: ANESTHESIOLOGY

## 2019-09-12 PROCEDURE — 2709999900 HC NON-CHARGEABLE SUPPLY: Performed by: ANESTHESIOLOGY

## 2019-09-12 PROCEDURE — 7100000011 HC PHASE II RECOVERY - ADDTL 15 MIN: Performed by: ANESTHESIOLOGY

## 2019-09-12 PROCEDURE — 6360000004 HC RX CONTRAST MEDICATION: Performed by: ANESTHESIOLOGY

## 2019-09-12 RX ORDER — BUPIVACAINE HYDROCHLORIDE 2.5 MG/ML
INJECTION, SOLUTION EPIDURAL; INFILTRATION; INTRACAUDAL PRN
Status: DISCONTINUED | OUTPATIENT
Start: 2019-09-12 | End: 2019-09-12 | Stop reason: ALTCHOICE

## 2019-09-12 RX ORDER — LIDOCAINE HYDROCHLORIDE 5 MG/ML
INJECTION, SOLUTION INFILTRATION; INTRAVENOUS PRN
Status: DISCONTINUED | OUTPATIENT
Start: 2019-09-12 | End: 2019-09-12 | Stop reason: ALTCHOICE

## 2019-09-12 RX ORDER — METHYLPREDNISOLONE ACETATE 40 MG/ML
INJECTION, SUSPENSION INTRA-ARTICULAR; INTRALESIONAL; INTRAMUSCULAR; SOFT TISSUE PRN
Status: DISCONTINUED | OUTPATIENT
Start: 2019-09-12 | End: 2019-09-12 | Stop reason: ALTCHOICE

## 2019-09-12 ASSESSMENT — PAIN SCALES - GENERAL
PAINLEVEL_OUTOF10: 0

## 2019-09-12 ASSESSMENT — PAIN - FUNCTIONAL ASSESSMENT: PAIN_FUNCTIONAL_ASSESSMENT: 0-10

## 2019-09-23 ENCOUNTER — TELEPHONE (OUTPATIENT)
Dept: CARDIOLOGY CLINIC | Age: 79
End: 2019-09-23

## 2019-09-23 DIAGNOSIS — R00.2 PALPITATIONS: Primary | ICD-10-CM

## 2019-09-24 ENCOUNTER — NURSE ONLY (OUTPATIENT)
Dept: CARDIOLOGY CLINIC | Age: 79
End: 2019-09-24

## 2019-09-27 ENCOUNTER — TELEPHONE (OUTPATIENT)
Dept: CARDIOLOGY CLINIC | Age: 79
End: 2019-09-27

## 2019-10-03 ENCOUNTER — OFFICE VISIT (OUTPATIENT)
Dept: PAIN MANAGEMENT | Age: 79
End: 2019-10-03
Payer: MEDICARE

## 2019-10-03 VITALS
OXYGEN SATURATION: 98 % | HEIGHT: 63 IN | HEART RATE: 78 BPM | BODY MASS INDEX: 24.63 KG/M2 | RESPIRATION RATE: 16 BRPM | TEMPERATURE: 98.6 F | SYSTOLIC BLOOD PRESSURE: 112 MMHG | DIASTOLIC BLOOD PRESSURE: 68 MMHG | WEIGHT: 139 LBS

## 2019-10-03 DIAGNOSIS — M54.16 LUMBAR RADICULOPATHY: ICD-10-CM

## 2019-10-03 DIAGNOSIS — M48.061 SPINAL STENOSIS OF LUMBAR REGION WITHOUT NEUROGENIC CLAUDICATION: ICD-10-CM

## 2019-10-03 DIAGNOSIS — M47.817 LUMBOSACRAL SPONDYLOSIS WITHOUT MYELOPATHY: ICD-10-CM

## 2019-10-03 DIAGNOSIS — M51.36 DDD (DEGENERATIVE DISC DISEASE), LUMBAR: ICD-10-CM

## 2019-10-03 DIAGNOSIS — M53.3 SACROILIAC DYSFUNCTION: Primary | ICD-10-CM

## 2019-10-03 PROCEDURE — 1123F ACP DISCUSS/DSCN MKR DOCD: CPT | Performed by: ANESTHESIOLOGY

## 2019-10-03 PROCEDURE — G8427 DOCREV CUR MEDS BY ELIG CLIN: HCPCS | Performed by: ANESTHESIOLOGY

## 2019-10-03 PROCEDURE — G8399 PT W/DXA RESULTS DOCUMENT: HCPCS | Performed by: ANESTHESIOLOGY

## 2019-10-03 PROCEDURE — G8420 CALC BMI NORM PARAMETERS: HCPCS | Performed by: ANESTHESIOLOGY

## 2019-10-03 PROCEDURE — 99213 OFFICE O/P EST LOW 20 MIN: CPT | Performed by: ANESTHESIOLOGY

## 2019-10-03 PROCEDURE — 1090F PRES/ABSN URINE INCON ASSESS: CPT | Performed by: ANESTHESIOLOGY

## 2019-10-03 PROCEDURE — 4040F PNEUMOC VAC/ADMIN/RCVD: CPT | Performed by: ANESTHESIOLOGY

## 2019-10-03 PROCEDURE — 1036F TOBACCO NON-USER: CPT | Performed by: ANESTHESIOLOGY

## 2019-10-03 PROCEDURE — G8484 FLU IMMUNIZE NO ADMIN: HCPCS | Performed by: ANESTHESIOLOGY

## 2019-10-14 ENCOUNTER — TELEPHONE (OUTPATIENT)
Dept: CARDIOLOGY CLINIC | Age: 79
End: 2019-10-14

## 2019-10-14 DIAGNOSIS — R00.2 PALPITATIONS: ICD-10-CM

## 2019-10-14 DIAGNOSIS — R05.9 COUGH: ICD-10-CM

## 2019-10-14 DIAGNOSIS — R60.0 LEG EDEMA: Primary | ICD-10-CM

## 2019-10-14 RX ORDER — HYDROCHLOROTHIAZIDE 25 MG/1
25 TABLET ORAL EVERY MORNING
Qty: 30 TABLET | Refills: 11 | Status: SHIPPED | OUTPATIENT
Start: 2019-10-14 | End: 2019-11-01 | Stop reason: SINTOL

## 2019-10-14 RX ORDER — FUROSEMIDE 20 MG/1
20 TABLET ORAL DAILY
Qty: 3 TABLET | Refills: 0 | Status: SHIPPED
Start: 2019-10-14 | End: 2020-09-24

## 2019-10-14 RX ORDER — POTASSIUM CHLORIDE 750 MG/1
10 TABLET, EXTENDED RELEASE ORAL DAILY
Qty: 3 TABLET | Refills: 0 | Status: SHIPPED
Start: 2019-10-14 | End: 2020-09-24

## 2019-10-21 ENCOUNTER — HOSPITAL ENCOUNTER (OUTPATIENT)
Age: 79
Discharge: HOME | End: 2019-10-21
Payer: MEDICARE

## 2019-10-21 DIAGNOSIS — R05.9 COUGH: ICD-10-CM

## 2019-10-21 DIAGNOSIS — R00.2 PALPITATIONS: ICD-10-CM

## 2019-10-21 DIAGNOSIS — R60.0 LEG EDEMA: ICD-10-CM

## 2019-10-21 LAB
ANION GAP SERPL CALCULATED.3IONS-SCNC: 12 MMOL/L (ref 7–16)
BUN BLDV-MCNC: 14 MG/DL (ref 8–23)
CALCIUM SERPL-MCNC: 9.1 MG/DL (ref 8.6–10.2)
CHLORIDE BLD-SCNC: 100 MMOL/L (ref 98–107)
CO2: 26 MMOL/L (ref 22–29)
CREAT SERPL-MCNC: 0.7 MG/DL (ref 0.5–1)
GFR AFRICAN AMERICAN: >60
GFR NON-AFRICAN AMERICAN: >60 ML/MIN/1.73
GLUCOSE BLD-MCNC: 85 MG/DL (ref 74–99)
MAGNESIUM: 2.1 MG/DL (ref 1.6–2.6)
POTASSIUM SERPL-SCNC: 3.8 MMOL/L (ref 3.5–5)
SODIUM BLD-SCNC: 138 MMOL/L (ref 132–146)

## 2019-10-21 PROCEDURE — 36415 COLL VENOUS BLD VENIPUNCTURE: CPT

## 2019-10-21 PROCEDURE — 80048 BASIC METABOLIC PNL TOTAL CA: CPT

## 2019-10-21 PROCEDURE — 83735 ASSAY OF MAGNESIUM: CPT

## 2019-10-31 DIAGNOSIS — R00.2 PALPITATIONS: ICD-10-CM

## 2019-11-01 ENCOUNTER — TELEPHONE (OUTPATIENT)
Dept: CARDIOLOGY CLINIC | Age: 79
End: 2019-11-01

## 2019-11-01 RX ORDER — HYDROCHLOROTHIAZIDE 12.5 MG/1
12.5 CAPSULE, GELATIN COATED ORAL DAILY
COMMUNITY
End: 2020-04-30

## 2019-12-20 ENCOUNTER — HOSPITAL ENCOUNTER (OUTPATIENT)
Dept: MAMMOGRAPHY | Age: 79
Discharge: HOME | End: 2019-12-22
Payer: MEDICARE

## 2019-12-20 DIAGNOSIS — Z12.31 ENCOUNTER FOR SCREENING MAMMOGRAM FOR MALIGNANT NEOPLASM OF BREAST: ICD-10-CM

## 2019-12-20 PROCEDURE — 77063 BREAST TOMOSYNTHESIS BI: CPT

## 2020-04-09 ENCOUNTER — TELEPHONE (OUTPATIENT)
Dept: CARDIOLOGY CLINIC | Age: 80
End: 2020-04-09

## 2020-04-30 ENCOUNTER — VIRTUAL VISIT (OUTPATIENT)
Dept: PAIN MANAGEMENT | Age: 80
End: 2020-04-30
Payer: MEDICARE

## 2020-04-30 PROCEDURE — G8399 PT W/DXA RESULTS DOCUMENT: HCPCS | Performed by: ANESTHESIOLOGY

## 2020-04-30 PROCEDURE — 4040F PNEUMOC VAC/ADMIN/RCVD: CPT | Performed by: ANESTHESIOLOGY

## 2020-04-30 PROCEDURE — 1090F PRES/ABSN URINE INCON ASSESS: CPT | Performed by: ANESTHESIOLOGY

## 2020-04-30 PROCEDURE — 99214 OFFICE O/P EST MOD 30 MIN: CPT | Performed by: ANESTHESIOLOGY

## 2020-04-30 PROCEDURE — G8427 DOCREV CUR MEDS BY ELIG CLIN: HCPCS | Performed by: ANESTHESIOLOGY

## 2020-04-30 PROCEDURE — 1123F ACP DISCUSS/DSCN MKR DOCD: CPT | Performed by: ANESTHESIOLOGY

## 2020-05-19 RX ORDER — AMITRIPTYLINE HYDROCHLORIDE 10 MG/1
10 TABLET, FILM COATED ORAL NIGHTLY
Qty: 30 TABLET | Refills: 2 | Status: SHIPPED
Start: 2020-05-19 | End: 2020-10-06 | Stop reason: ALTCHOICE

## 2020-07-06 ENCOUNTER — HOSPITAL ENCOUNTER (OUTPATIENT)
Age: 80
Discharge: HOME | End: 2020-07-06
Payer: MEDICARE

## 2020-07-06 LAB
ALBUMIN SERPL-MCNC: 4.2 G/DL (ref 3.5–5.2)
ALP BLD-CCNC: 64 U/L (ref 35–104)
ALT SERPL-CCNC: 7 U/L (ref 0–32)
ANION GAP SERPL CALCULATED.3IONS-SCNC: 10 MMOL/L (ref 7–16)
AST SERPL-CCNC: 15 U/L (ref 0–31)
BILIRUB SERPL-MCNC: 0.5 MG/DL (ref 0–1.2)
BUN BLDV-MCNC: 11 MG/DL (ref 8–23)
CALCIUM SERPL-MCNC: 9 MG/DL (ref 8.6–10.2)
CHLORIDE BLD-SCNC: 104 MMOL/L (ref 98–107)
CHOLESTEROL, TOTAL: 164 MG/DL (ref 0–199)
CO2: 26 MMOL/L (ref 22–29)
CREAT SERPL-MCNC: 0.7 MG/DL (ref 0.5–1)
GFR AFRICAN AMERICAN: >60
GFR NON-AFRICAN AMERICAN: >60 ML/MIN/1.73
GLUCOSE BLD-MCNC: 90 MG/DL (ref 74–99)
HCT VFR BLD CALC: 38.8 % (ref 34–48)
HDLC SERPL-MCNC: 76 MG/DL
HEMOGLOBIN: 12.6 G/DL (ref 11.5–15.5)
LDL CHOLESTEROL CALCULATED: 66 MG/DL (ref 0–99)
MCH RBC QN AUTO: 31 PG (ref 26–35)
MCHC RBC AUTO-ENTMCNC: 32.5 % (ref 32–34.5)
MCV RBC AUTO: 95.3 FL (ref 80–99.9)
PDW BLD-RTO: 13.4 FL (ref 11.5–15)
PLATELET # BLD: 262 E9/L (ref 130–450)
PMV BLD AUTO: 9.6 FL (ref 7–12)
POTASSIUM SERPL-SCNC: 4.4 MMOL/L (ref 3.5–5)
RBC # BLD: 4.07 E12/L (ref 3.5–5.5)
SODIUM BLD-SCNC: 140 MMOL/L (ref 132–146)
TOTAL PROTEIN: 6.8 G/DL (ref 6.4–8.3)
TRIGL SERPL-MCNC: 108 MG/DL (ref 0–149)
TSH SERPL DL<=0.05 MIU/L-ACNC: 3.05 UIU/ML (ref 0.27–4.2)
VLDLC SERPL CALC-MCNC: 22 MG/DL
WBC # BLD: 5.3 E9/L (ref 4.5–11.5)

## 2020-07-06 PROCEDURE — 80053 COMPREHEN METABOLIC PANEL: CPT

## 2020-07-06 PROCEDURE — 84443 ASSAY THYROID STIM HORMONE: CPT

## 2020-07-06 PROCEDURE — 36415 COLL VENOUS BLD VENIPUNCTURE: CPT

## 2020-07-06 PROCEDURE — 85027 COMPLETE CBC AUTOMATED: CPT

## 2020-07-06 PROCEDURE — 80061 LIPID PANEL: CPT

## 2020-07-15 ENCOUNTER — TELEPHONE (OUTPATIENT)
Dept: CARDIOLOGY CLINIC | Age: 80
End: 2020-07-15

## 2020-09-24 ENCOUNTER — PREP FOR PROCEDURE (OUTPATIENT)
Dept: PAIN MANAGEMENT | Age: 80
End: 2020-09-24

## 2020-09-24 ENCOUNTER — OFFICE VISIT (OUTPATIENT)
Dept: PAIN MANAGEMENT | Age: 80
End: 2020-09-24
Payer: MEDICARE

## 2020-09-24 VITALS
DIASTOLIC BLOOD PRESSURE: 68 MMHG | HEART RATE: 68 BPM | RESPIRATION RATE: 16 BRPM | TEMPERATURE: 97.9 F | SYSTOLIC BLOOD PRESSURE: 124 MMHG

## 2020-09-24 PROCEDURE — 1036F TOBACCO NON-USER: CPT | Performed by: ANESTHESIOLOGY

## 2020-09-24 PROCEDURE — G8427 DOCREV CUR MEDS BY ELIG CLIN: HCPCS | Performed by: ANESTHESIOLOGY

## 2020-09-24 PROCEDURE — G8399 PT W/DXA RESULTS DOCUMENT: HCPCS | Performed by: ANESTHESIOLOGY

## 2020-09-24 PROCEDURE — 99213 OFFICE O/P EST LOW 20 MIN: CPT | Performed by: ANESTHESIOLOGY

## 2020-09-24 PROCEDURE — 1090F PRES/ABSN URINE INCON ASSESS: CPT | Performed by: ANESTHESIOLOGY

## 2020-09-24 PROCEDURE — 1123F ACP DISCUSS/DSCN MKR DOCD: CPT | Performed by: ANESTHESIOLOGY

## 2020-09-24 PROCEDURE — G8420 CALC BMI NORM PARAMETERS: HCPCS | Performed by: ANESTHESIOLOGY

## 2020-09-24 PROCEDURE — 20610 DRAIN/INJ JOINT/BURSA W/O US: CPT | Performed by: ANESTHESIOLOGY

## 2020-09-24 PROCEDURE — 99213 OFFICE O/P EST LOW 20 MIN: CPT

## 2020-09-24 PROCEDURE — 4040F PNEUMOC VAC/ADMIN/RCVD: CPT | Performed by: ANESTHESIOLOGY

## 2020-09-24 RX ORDER — BUPIVACAINE HYDROCHLORIDE 2.5 MG/ML
9 INJECTION, SOLUTION EPIDURAL; INFILTRATION; INTRACAUDAL ONCE
Status: COMPLETED | OUTPATIENT
Start: 2020-09-24 | End: 2020-09-24

## 2020-09-24 RX ORDER — METHYLPREDNISOLONE ACETATE 40 MG/ML
40 INJECTION, SUSPENSION INTRA-ARTICULAR; INTRALESIONAL; INTRAMUSCULAR; SOFT TISSUE ONCE
Status: COMPLETED | OUTPATIENT
Start: 2020-09-24 | End: 2020-09-24

## 2020-09-24 RX ORDER — CHLORHEXIDINE GLUCONATE 0.12 MG/ML
RINSE ORAL
COMMUNITY
Start: 2020-09-15 | End: 2020-10-22

## 2020-09-24 RX ADMIN — BUPIVACAINE HYDROCHLORIDE 22.5 MG: 2.5 INJECTION, SOLUTION EPIDURAL; INFILTRATION; INTRACAUDAL at 16:03

## 2020-09-24 RX ADMIN — METHYLPREDNISOLONE ACETATE 40 MG: 40 INJECTION, SUSPENSION INTRA-ARTICULAR; INTRALESIONAL; INTRAMUSCULAR; SOFT TISSUE at 16:04

## 2020-09-24 NOTE — PROGRESS NOTES
Do you currently have any of the following:    Fever: No  Headache:  No  Cough: No  Shortness of breath: No  Exposed to anyone with these symptoms: No         Inell Dubon presents to the [address] [place] [address] on 9/24/2020. Brent Romero is complaining of pain in her lower back, down the right leg, and right shoulder. The pain is persistent. The pain is described as sharp, burning and numb. Pain is rated on her best day at a 5, on her worst day at a 8, and on average at a 5 on the VAS scale. She took her last dose of Tylenol today. Any procedures since your last visit: No.    Pacemaker or defibrilator: No managed by N/A. She is not on NSAIDS and is  on anticoagulation medications to include Plavix and is managed by Chilango Olivas MD.     /68   Pulse 68   Temp 97.9 °F (36.6 °C)   Resp 16      No LMP recorded.  Patient is postmenopausal.

## 2020-09-24 NOTE — PROGRESS NOTES
MIKAYLA HESS [facility] - BEHAVIORAL HEALTH SERVICES Pain Management        [address] [facility], [address] [facility]  Dept: [phone]        Follow up Note      Luis Antonio Whaley     Date of Visit:  2020    CC:  Patient presents for follow up   Chief Complaint   Patient presents with    Follow-up    Lower Back Pain     down right leg    Shoulder Pain     right       HPI:  History of chronic low back pain and occasional lower extremity pain.     She had trochanteric bursa injection by ortho.     Since the last visit, she has undergone B/l SIJ injection which had helped her significantly( > 60% relief for over 2 months)     Pain is recurred.     Change in quality of symptoms:no. Medication side effects:none. Recent diagnostic testing:none. Recent interventional procedures:none.     Previous notes and pain history details reviewed.      No new weakness or numbness or bowel or bladder symptoms.     She has been on anticoagulation medications to include Plavix.     The patient  has not been on herbal supplements.       The patient is not diabetic.     Imaging studies:  Imagin/2019 lumbar MRI:  1. Degenerative spondylotic changes. 2. T12-L1: Midline to left-sided disc protrusion encroaching on the   left lateral recess. 3. L1-2: Right lateral to far lateral disc protrusion encroaching   moderately on the right lateral recess and also encroaching on the   right intervertebral foramen mildly crowding the exiting right L1   nerve root.    4. L2-3: Disc bulging with mild to moderate canalicular stenosis and   also encroachment on the right intervertebral foramen mildly crowding   the exiting right L2 nerve root.      2019 Right hip x-ray     Patient MRN:  [id number]   : 1940   Age: 66 years   Gender: Female   Order Date:  2019 11:00 AM   EXAM: XR HIP 2-3 VW W PELVIS RIGHT   NUMBER OF IMAGES:  3 views   INDICATION:  worsening chronic pain    worsening chronic pain   COMPARISON: None       .   The bones appear to be in anatomic alignment. No foreign body is identified   No fracture is identified     There is moderate joint space loss    The are moderate degenerative changes present            Impression   Moderate degenerative changes      2019 Lumbar Spine X-ray     Patient MRN:  [id number]   : 1940   Age: 66 years   Gender: Female   Order Date:  2019 11:00 AM   EXAM: XR LUMBAR SPINE (MIN 4 VIEWS)   NUMBER OF IMAGES:  5 views   INDICATION:  worsening Chronic low back pain    worsening Chronic low back pain   COMPARISON: None           Alignment of the vertebral bodies appears to be near-anatomic   No fracture or foreign body is identified. The disc spaces demonstrate severe degenerative changes. There is mild loss of the vertebral body height   The are severe degenerative changes involving the facets.           Impression   Findings consistent with degenerative disc disease and   degenerative facets disease                                            Potential Aberrant Drug-Related Behavior:  no     Urine Drug Screening: no     OARRS report:Reviewed on:   2019- not on chronic narcotics. 2020: Consistent  2020: Consistent      Past Medical History:   Diagnosis Date    Arthritis     History of double vision     both  eyes    Hyperlipidemia     Hypertension     Joint pain     Low back pain     PONV (postoperative nausea and vomiting)     Raynaud's disease     TIA (transient ischemic attack)        Past Surgical History:   Procedure Laterality Date    APPENDECTOMY      COLONOSCOPY  2017    EYE SURGERY Bilateral     cataract removal w lense implants    HC INJECT OTHER PERPHRL NERV Bilateral 2019    BILATERAL SACROILIAC JOINT INJECTION performed by Wiley Guzman MD at [address] [facility] ENDOSCOPY  2017       Prior to Admission medications    Medication Sig Start Date End Date Taking?  Authorizing Provider chlorhexidine (PERIDEX) 0.12 % solution RINSE WITH 15 MLS BY MOUTH FOR 30 SECONDS TWICE A DAY AND EXPECTORATE 9/15/20  Yes Historical Provider, MD   amitriptyline (ELAVIL) 10 MG tablet Take 1 tablet by mouth nightly 5/19/20  Yes Yefri Ellington MD   Multiple Vitamins-Minerals (PRESERVISION AREDS) CAPS Take 1 capsule by mouth 2 times daily   Yes Historical Provider, MD   simvastatin (ZOCOR) 20 MG tablet 20 mg nightly  8/1/16  Yes Historical Provider, MD   losartan (COZAAR) 50 MG tablet Take 50 mg by mouth daily  8/21/16  Yes Historical Provider, MD   amLODIPine (NORVASC) 2.5 MG tablet Take 2.5 mg by mouth nightly  3/14/16  Yes Historical Provider, MD   Biotin 1000 MCG TABS Take 1 tablet by mouth daily    Yes Historical Provider, MD   Coenzyme Q10 (CO Q 10) 100 MG CAPS Take 200 mg by mouth daily   Yes Historical Provider, MD   Cholecalciferol (VITAMIN D3) 3000 UNITS TABS Take 5,000 Units by mouth daily    Yes Historical Provider, MD   acetaminophen (TYLENOL) 500 MG tablet Take 500 mg by mouth every 6 hours as needed for Pain   Yes Historical Provider, MD   clopidogrel (PLAVIX) 75 MG tablet Take 75 mg by mouth daily.      Yes Historical Provider, MD       No Known Allergies    Social History     Socioeconomic History    Marital status:      Spouse name: Not on file    Number of children: Not on file    Years of education: Not on file    Highest education level: Not on file   Occupational History    Occupation: retired   Social Needs    Financial resource strain: Not on file    Food insecurity     Worry: Not on file     Inability: Not on file   [employer] needs     Medical: Not on file     Non-medical: Not on file   Tobacco Use    Smoking status: Never Smoker    Smokeless tobacco: Never Used   Substance and Sexual Activity    Alcohol use: No     Alcohol/week: 0.0 standard drinks    Drug use: No    Sexual activity: Not on file   Lifestyle    Physical activity     Days per week: Not on file     Minutes per session: Not on file    Stress: Not on file   Relationships    Social connections     Talks on phone: Not on file     Gets together: Not on file     Attends Christianity service: Not on file     Active member of club or organization: Not on file     Attends meetings of clubs or organizations: Not on file     Relationship status: Not on file    Intimate partner violence     Fear of current or ex partner: Not on file     Emotionally abused: Not on file     Physically abused: Not on file     Forced sexual activity: Not on file   Other Topics Concern    Not on file   Social History Narrative    Not on file       Family History   Problem Relation Age of Onset    Cancer Mother     Heart Disease Father     Heart Attack Father 52         MI       REVIEW OF SYSTEMS:     Laney Hodgkin denies fever/chills, chest pain, shortness of breath, new bowel or bladder complaints. All other review of systems was negative. PHYSICAL EXAMINATION:      /68   Pulse 68   Temp 97.9 °F (36.6 °C)   Resp 16   General:        A & O x3     HEENT:     Head:normocephalic and atraumatic     Lungs:     Breathing: Appears Normal     Cervical spine:     Inspection:Normal  Range of motion:reduced     Lumbar spine:     Range of motion:reduced moderately Lateral bending, flexion, extension rotation bilateral and is somewhat painful. SIJ tenderness + Bilaterally  INNA's + and Gaenslen's +     Extremities:     Tremors:None bilaterally upper and lower  Right trochanteric bursa tenderness +     Neurological:     Motor:          No apparent weakness per patient          Dermatology:     Skin:no unusual rashes     Assessment and Plan:   Diagnosis Orders   1. DDD (degenerative disc disease), lumbar      2. Lumbosacral spondylosis without myelopathy      3. Sacroiliac dysfunction      4. Spinal stenosis of lumbar region without neurogenic claudication      5. Primary osteoarthritis of right hip      6.  Trochanteric bursitis, unspecified laterality           [de-identified] y.o.  F with h/o chronic low back and Lower extremity pain .     Failed conservative treatment.  Has done PT in . Continues HEP.     MRI changes noted - significant changes at L1-2.     Is on Plavix for h/o TIA.     Is s/p trochanteric bursa injection by ortho in the past which had helped. Currently has pain over the right trochanteric bursa.     She also had significant pain over the bilateral sacroiliac joint area.      Excellent pain relief from the b/l SIJ injection with 80% pain relief for > 2-3 months.     Pain has recurred.      Failed gabapentin trial.     Does not want narcotics.     Will Schedule for b/l SIJ injection under fluoroscopy +  under fluoroscopic. RBA discussed. Can continue Plavix for SIJ injection. Will do right trochanteric bursa injection today.      If her radicular pain persists, will consider LESI L1/2 or L2-3 (paramedian approach)- but for this she needs to be off Plavix for 7 days prior.      Continue HEP.     Has used Compound cream for local use. Mary Donnelly MD    2020    Patient: Florecita Charles  :  1940  Age:  [de-identified] y.o. Sex:  female     PRE-OPERATIVE DIAGNOSIS: Right Greater trochanter bursitis. POST-OPERATIVE DIAGNOSIS: Same. PROCEDURE PERFORMED: Right Greater trochanter bursa steroid injection. SURGEON:   Mary Donnelly MD    ANESTHESIA: LOCAL    ESTIMATED BLOOD LOSS: None. BRIEF HISTORY: After discussing the potential risks and benefits of the procedure with the patient  [employer] did request that we proceed. A complete History & Physical was reviewed and it is unchanged. DESCRIPTION OF PROCEDURE:   Patient was placed in the lateral decubitus position. The area of the Right  hip was prepped with ChloraPrep and draped in a sterile manner. The overlying skin and subcutaneous tissues were anesthetized.  After identifying the anatomic landmarks, a 25 gauge spinal 3- 1/2 inch spinal needle was advanced until greater trochanter was contacted. After negative aspiration, a solution of 0.25 % marcaine 8 cc and 30 mg DepoMedrol was injected easily after negative aspiration without complications. The needle was then removed and Band-Aid applied. Disposition the patient tolerated the procedure well and there were no complications . Mitchel Saxena will follow up in our comprehensive Pain Management Center as scheduled. She was encouraged to call with questions, concerns or if worsening of symptoms occurs.     Ny Montero MD      CC:  Marcie Gonzalez MD

## 2020-10-12 ENCOUNTER — HOSPITAL ENCOUNTER (OUTPATIENT)
Dept: GENERAL RADIOLOGY | Age: 80
Setting detail: OUTPATIENT SURGERY
Discharge: HOME | End: 2020-10-14
Attending: ANESTHESIOLOGY
Payer: MEDICARE

## 2020-10-12 ENCOUNTER — HOSPITAL ENCOUNTER (OUTPATIENT)
Age: 80
Setting detail: OUTPATIENT SURGERY
Discharge: HOME | End: 2020-10-12
Attending: ANESTHESIOLOGY | Admitting: ANESTHESIOLOGY
Payer: MEDICARE

## 2020-10-12 VITALS
BODY MASS INDEX: 21.26 KG/M2 | HEART RATE: 74 BPM | HEIGHT: 63 IN | RESPIRATION RATE: 15 BRPM | DIASTOLIC BLOOD PRESSURE: 66 MMHG | WEIGHT: 120 LBS | TEMPERATURE: 97.6 F | SYSTOLIC BLOOD PRESSURE: 139 MMHG | OXYGEN SATURATION: 99 %

## 2020-10-12 PROCEDURE — 27096 INJECT SACROILIAC JOINT: CPT | Performed by: ANESTHESIOLOGY

## 2020-10-12 PROCEDURE — 2500000003 HC RX 250 WO HCPCS: Performed by: ANESTHESIOLOGY

## 2020-10-12 PROCEDURE — 7100000011 HC PHASE II RECOVERY - ADDTL 15 MIN: Performed by: ANESTHESIOLOGY

## 2020-10-12 PROCEDURE — 6360000002 HC RX W HCPCS: Performed by: ANESTHESIOLOGY

## 2020-10-12 PROCEDURE — 3600000002 HC SURGERY LEVEL 2 BASE: Performed by: ANESTHESIOLOGY

## 2020-10-12 PROCEDURE — 7100000010 HC PHASE II RECOVERY - FIRST 15 MIN: Performed by: ANESTHESIOLOGY

## 2020-10-12 PROCEDURE — 3209999900 FLUORO FOR SURGICAL PROCEDURES

## 2020-10-12 PROCEDURE — 2709999900 HC NON-CHARGEABLE SUPPLY: Performed by: ANESTHESIOLOGY

## 2020-10-12 PROCEDURE — 6360000004 HC RX CONTRAST MEDICATION: Performed by: ANESTHESIOLOGY

## 2020-10-12 RX ORDER — LIDOCAINE HYDROCHLORIDE 5 MG/ML
INJECTION, SOLUTION INFILTRATION; INTRAVENOUS PRN
Status: DISCONTINUED | OUTPATIENT
Start: 2020-10-12 | End: 2020-10-12 | Stop reason: ALTCHOICE

## 2020-10-12 RX ORDER — BUPIVACAINE HYDROCHLORIDE 2.5 MG/ML
INJECTION, SOLUTION EPIDURAL; INFILTRATION; INTRACAUDAL PRN
Status: DISCONTINUED | OUTPATIENT
Start: 2020-10-12 | End: 2020-10-12 | Stop reason: ALTCHOICE

## 2020-10-12 RX ORDER — METHYLPREDNISOLONE ACETATE 40 MG/ML
INJECTION, SUSPENSION INTRA-ARTICULAR; INTRALESIONAL; INTRAMUSCULAR; SOFT TISSUE PRN
Status: DISCONTINUED | OUTPATIENT
Start: 2020-10-12 | End: 2020-10-12 | Stop reason: ALTCHOICE

## 2020-10-12 ASSESSMENT — PAIN SCALES - GENERAL
PAINLEVEL_OUTOF10: 3
PAINLEVEL_OUTOF10: 3

## 2020-10-12 ASSESSMENT — PAIN DESCRIPTION - DESCRIPTORS
DESCRIPTORS: BURNING
DESCRIPTORS: BURNING

## 2020-10-12 ASSESSMENT — PAIN DESCRIPTION - ORIENTATION
ORIENTATION: LOWER
ORIENTATION: LOWER

## 2020-10-12 ASSESSMENT — PAIN DESCRIPTION - PROGRESSION: CLINICAL_PROGRESSION: GRADUALLY IMPROVING

## 2020-10-12 ASSESSMENT — PAIN - FUNCTIONAL ASSESSMENT: PAIN_FUNCTIONAL_ASSESSMENT: 0-10

## 2020-10-12 ASSESSMENT — PAIN DESCRIPTION - PAIN TYPE: TYPE: CHRONIC PAIN

## 2020-10-12 ASSESSMENT — PAIN DESCRIPTION - LOCATION
LOCATION: BACK
LOCATION: BACK

## 2020-10-12 NOTE — H&P
MIKAYLA STERN [facility] - BEHAVIORAL HEALTH SERVICES Pain Management        [address] [facility], [address]  Dept: [phone]    Procedure History & Physical      Pierre Zamarripa     HPI:    Patient  is here for SIJ injection  for low back pain. Labs/imaging studies reviewed   All question and concerns addressed including R/B/A associated with the procedure    Past Medical History:   Diagnosis Date    Arthritis     History of double vision     both  eyes    Hyperlipidemia     Hypertension     Joint pain     Low back pain     PONV (postoperative nausea and vomiting)     Raynaud's disease     TIA (transient ischemic attack) 2012    no deficits, taking Plavix        Past Surgical History:   Procedure Laterality Date    APPENDECTOMY      COLONOSCOPY  09/26/2017    EYE SURGERY Bilateral     cataract removal w lense implants    HC INJECT OTHER PERPHRL NERV Bilateral 9/12/2019    BILATERAL SACROILIAC JOINT INJECTION performed by Johnathon Britton MD at [address] [facility] ENDOSCOPY  09/26/2017       Prior to Admission medications    Medication Sig Start Date End Date Taking?  Authorizing Provider   chlorhexidine (PERIDEX) 0.12 % solution RINSE WITH 15 MLS BY MOUTH FOR 30 SECONDS TWICE A DAY AND EXPECTORATE 9/15/20  Yes Historical Provider, MD   Multiple Vitamins-Minerals (PRESERVISION AREDS) CAPS Take 1 capsule by mouth 2 times daily   Yes Historical Provider, MD   simvastatin (ZOCOR) 20 MG tablet 20 mg nightly  8/1/16  Yes Historical Provider, MD   losartan (COZAAR) 50 MG tablet Take 50 mg by mouth daily  8/21/16  Yes Historical Provider, MD   amLODIPine (NORVASC) 2.5 MG tablet Take 2.5 mg by mouth nightly  3/14/16  Yes Historical Provider, MD   Biotin 1000 MCG TABS Take 1 tablet by mouth daily    Yes Historical Provider, MD   Coenzyme Q10 (CO Q 10) 100 MG CAPS Take 200 mg by mouth daily   Yes Historical Provider, MD   Cholecalciferol (VITAMIN D3) 3000 UNITS TABS Take 5,000 Units by mouth daily    Yes Historical Provider, MD   acetaminophen (TYLENOL) 500 MG tablet Take 500 mg by mouth every 6 hours as needed for Pain   Yes Historical Provider, MD   clopidogrel (PLAVIX) 75 MG tablet Take 75 mg by mouth daily.      Yes Historical Provider, MD       No Known Allergies    Social History     Socioeconomic History    Marital status:      Spouse name: Not on file    Number of children: Not on file    Years of education: Not on file    Highest education level: Not on file   Occupational History    Occupation: retired   Social Needs    Financial resource strain: Not on file    Food insecurity     Worry: Not on file     Inability: Not on file   [employer] needs     Medical: Not on file     Non-medical: Not on file   Tobacco Use    Smoking status: Never Smoker    Smokeless tobacco: Never Used   Substance and Sexual Activity    Alcohol use: No     Alcohol/week: 0.0 standard drinks    Drug use: No    Sexual activity: Not on file   Lifestyle    Physical activity     Days per week: Not on file     Minutes per session: Not on file    Stress: Not on file   Relationships    Social connections     Talks on phone: Not on file     Gets together: Not on file     Attends Druze service: Not on file     Active member of club or organization: Not on file     Attends meetings of clubs or organizations: Not on file     Relationship status: Not on file    Intimate partner violence     Fear of current or ex partner: Not on file     Emotionally abused: Not on file     Physically abused: Not on file     Forced sexual activity: Not on file   Other Topics Concern    Not on file   Social History Narrative    Not on file       Family History   Problem Relation Age of Onset    Cancer Mother     Heart Disease Father     Heart Attack Father 52         MI         REVIEW OF SYSTEMS:    CONSTITUTIONAL:  negative for  fevers, chills, sweats and fatigue    RESPIRATORY:  negative for dry cough, cough with sputum, dyspnea, wheezing and chest pain    CARDIOVASCULAR:  negative for chest pain, dyspnea, palpitations, syncope    GASTROINTESTINAL:  negative for nausea, vomiting, change in bowel habits, diarrhea, constipation and abdominal pain    MUSCULOSKELETAL: negative for muscle weakness    SKIN: negative for itching or rashes. BEHAVIOR/PSYCH:  negative for poor appetite, increased appetite, decreased sleep and poor concentration    All other systems negative      PHYSICAL EXAM:    VITALS:  BP (!) 151/74   Pulse 87   Temp 97.5 °F (36.4 °C)   Resp 16   Ht 5' 3\" (1.6 m)   Wt 120 lb (54.4 kg)   SpO2 93%   BMI 21.26 kg/m²     CONSTITUTIONAL:  awake, alert, cooperative, no apparent distress, and appears stated age    EYES: PERRLA, EOMI    LUNGS:  No increased work of breathing, no audible wheezing    CARDIOVASCULAR:  regular rate and rhythm    ABDOMEN:  Soft non tender non distended     EXTREMITIES: no signs of clubbing or cyanosis. MUSCULOSKELETAL: negative for flaccid muscle tone or spastic movements. SKIN: gross examination reveals no signs of rashes, or diaphoresis. NEURO: Cranial nerves II-XII grossly intact. No signs of agitated mood. Assessment/Plan:    Patient  is here for SIJ injection  for low back pain. The patient was counseled at length about the risks of ashley Covid-19 during their perioperative period and any recovery window from their procedure. The patient was made aware that ashley Covid-19  may worsen their prognosis for recovering from their procedure  and lend to a higher morbidity and/or mortality risk. All material risks, benefits, and reasonable alternatives including postponing the procedure were discussed. The patient does wish to proceed with the procedure at this time.     Margaret Thrasher MD

## 2020-10-12 NOTE — OP NOTE
Operative Note      Patient: Chase Feliz  YOB: 1940  MRN: [id number]    Date of Procedure: 10/12/2020    Pre-Op Diagnosis: SACROILIAC DYSFUNCTION    Post-Op Diagnosis: Same       Procedure(s):  BILATERAL SACROILIAC JOINT INJECTION UNDER FLUOROSCOPY    Surgeon(s):  Atul Her MD    Assistant:   * No surgical staff found *    Anesthesia: None    Estimated Blood Loss (mL): Minimal    Complications: None    Specimens:   * No specimens in log *    Implants:  * No implants in log *      Drains: * No LDAs found *    Findings: good needle placement    Detailed Description of Procedure:   10/12/2020    Patient: Chase Fleiz  :  1940  Age:  [de-identified] y.o. Sex:  female     PRE-OPERATIVE DIAGNOSIS: Bilateral   Sacroiliitis, somatic dysfunction of the lumbosacral spine. POST-OPERATIVE DIAGNOSIS: Same. PROCEDURE:  Fluoroscopic guided Bilateral   sacroiliac joint injection with steroid (#2). SURGEON:  Atul Her MD    ANESTHESIA: local    ESTIMATED BLOOD LOSS: None.  ______________________________________________________________________  BRIEF HISTORY:  Chase Feliz comes in today for second Bilateral sacroiliac joint injection under fluoroscopic guidance. The potential complications as well as the procedure in detail were explained to her today. She has elected to undergo the aforementioned procedure. Matilde's complete History & Physical examination were reviewed in depth, a copy of which is in the chart. DESCRIPTION OF PROCEDURE:    After confirming written and informed consent, a time-out was performed and Regis name and date of birth, the procedure to be performed as well as the plan for the location of the needle insertion were confirmed. The patient was brought into the procedure room and placed in the prone position on the fluoroscopy table. Standard monitors were placed and vital signs were observed throughout the procedure.  The low back and upper buttocks area was prepped with chloraprep and draped in a sterile manner. AP fluoroscopy was used to visualize the sacroiliac joint. The fluoroscopic beam was then obliqued until the anterior and posterior margins of the joint were aligned. The inferior margin of the joint was identified and marked. The skin and subcutaneous tissue about this identified point were anesthestized with 0.5% lidocaine. A 22 gauge 3-1/2 spinal needle was advanced toward the the identified point under fluoroscopic guidance. Once the targeted point was reached and the joint space was entered, negative aspiration was confirmed, and 0.5 cc of 240 omnipaque was injected. The  Joint space was appropriately outlined. Then, after negative aspiration, a solution consisiting of 0.25% marcaine 2 cc and 30 mg DepoMedrol was easily injected on each side. The needle was then removed and the needle insertion site was covered with Band-Aid. Disposition the patient tolerated the procedure well and there were no complications . Vital signs remained stable throughout the procedure. The patient was escorted to the recovery area where they remained until discharge and written discharge instructions for the procedure were given. Plan: Dada Killian will return to our pain management center as scheduled.      Lilo East MD

## 2020-10-20 ENCOUNTER — HOSPITAL ENCOUNTER (OUTPATIENT)
Age: 80
Discharge: HOME | End: 2020-10-22
Payer: MEDICARE

## 2020-10-20 ENCOUNTER — HOSPITAL ENCOUNTER (OUTPATIENT)
Dept: ULTRASOUND IMAGING | Age: 80
Discharge: HOME | End: 2020-10-22
Payer: MEDICARE

## 2020-10-20 PROCEDURE — 93880 EXTRACRANIAL BILAT STUDY: CPT

## 2020-10-22 ENCOUNTER — OFFICE VISIT (OUTPATIENT)
Dept: CARDIOLOGY CLINIC | Age: 80
End: 2020-10-22
Payer: MEDICARE

## 2020-10-22 VITALS
SYSTOLIC BLOOD PRESSURE: 128 MMHG | HEART RATE: 81 BPM | RESPIRATION RATE: 12 BRPM | BODY MASS INDEX: 21.79 KG/M2 | HEIGHT: 63 IN | WEIGHT: 123 LBS | DIASTOLIC BLOOD PRESSURE: 72 MMHG

## 2020-10-22 PROCEDURE — G8484 FLU IMMUNIZE NO ADMIN: HCPCS | Performed by: INTERNAL MEDICINE

## 2020-10-22 PROCEDURE — 4040F PNEUMOC VAC/ADMIN/RCVD: CPT | Performed by: INTERNAL MEDICINE

## 2020-10-22 PROCEDURE — 1123F ACP DISCUSS/DSCN MKR DOCD: CPT | Performed by: INTERNAL MEDICINE

## 2020-10-22 PROCEDURE — G8399 PT W/DXA RESULTS DOCUMENT: HCPCS | Performed by: INTERNAL MEDICINE

## 2020-10-22 PROCEDURE — 99214 OFFICE O/P EST MOD 30 MIN: CPT | Performed by: INTERNAL MEDICINE

## 2020-10-22 PROCEDURE — 1036F TOBACCO NON-USER: CPT | Performed by: INTERNAL MEDICINE

## 2020-10-22 PROCEDURE — G8420 CALC BMI NORM PARAMETERS: HCPCS | Performed by: INTERNAL MEDICINE

## 2020-10-22 PROCEDURE — 1090F PRES/ABSN URINE INCON ASSESS: CPT | Performed by: INTERNAL MEDICINE

## 2020-10-22 PROCEDURE — 93000 ELECTROCARDIOGRAM COMPLETE: CPT | Performed by: INTERNAL MEDICINE

## 2020-10-22 PROCEDURE — G8427 DOCREV CUR MEDS BY ELIG CLIN: HCPCS | Performed by: INTERNAL MEDICINE

## 2020-10-22 NOTE — PROGRESS NOTES
Karrieshilo Lackey  1940  Date of Service: 10/22/2020    Patient Active Problem List    Diagnosis Date Noted    Sacroiliac dysfunction 08/19/2019    Platelet inhibition due to Plavix 08/19/2019    Chronic pain syndrome 07/23/2019    Lumbar radiculopathy 07/23/2019    Chronic right-sided low back pain with right-sided sciatica 07/08/2019    Lumbosacral spondylosis without myelopathy 07/08/2019    Primary osteoarthritis of right hip 07/08/2019    Spinal stenosis of lumbar region without neurogenic claudication 07/08/2019    DDD (degenerative disc disease), lumbar 07/08/2019    Nonrheumatic aortic valve insufficiency 03/22/2019     Overview Note:     Trace      Primary osteoarthritis of right knee 10/11/2018    Primary osteoarthritis of right shoulder 01/11/2017       Social History     Socioeconomic History    Marital status:      Spouse name: None    Number of children: None    Years of education: None    Highest education level: None   Occupational History    Occupation: retired   Social Needs    Financial resource strain: None    Food insecurity     Worry: None     Inability: None    Transportation needs     Medical: None     Non-medical: None   Tobacco Use    Smoking status: Never Smoker    Smokeless tobacco: Never Used   Substance and Sexual Activity    Alcohol use: No     Alcohol/week: 0.0 standard drinks    Drug use: No    Sexual activity: None   Lifestyle    Physical activity     Days per week: None     Minutes per session: None    Stress: None   Relationships    Social connections     Talks on phone: None     Gets together: None     Attends Catholic service: None     Active member of club or organization: None     Attends meetings of clubs or organizations: None     Relationship status: None    Intimate partner violence     Fear of current or ex partner: None     Emotionally abused: None     Physically abused: None     Forced sexual activity: None   Other Topics changes in his/her vision, hearing, bowel or bladder habits. She is not known to have a history of thyroid problems. No recent nose bleeds. PHYSICAL EXAM:  Vitals:    10/22/20 1427   BP: 128/72   Pulse: 81   Resp: 12   Weight: 123 lb (55.8 kg)   Height: 5' 3\" (1.6 m)       GENERAL:  She is alert and oriented x 3, communicates well, in no distress. NECK:  No masses, trachea is mid position. Supple, full ROM, no JVD or bruits. No palpable thyromegaly or lymphadenopathy. HEART:  Regular rate and rhythm. Normal S1 and S2. There is an S4 gallop. There is a faint systolic murmur. LUNGS:  Clear to auscultation bilaterally. No use of accessory muscles. symmetrical excursion. ABDOMEN:  Soft, non-tender. Normal bowel sounds. EXTREMITIES:  Full ROM x 4. Thick but mild bilateral lower extremity edema. Good distal pulses. EYES:  Extraocular muscles intact. PERRL. Normal lids & conjunctiva. ENT:  Nares are clear & not bleeding. Moist mucosa. Normal lips formation. No external masses   NEURO: no tremors, full ROM x 4, EOMI. SKIN:  Warm, dry and intact. Normal turgor. EKG: Sinus rhythm, 81 bpm, nl axis, nonspecific ST - T wave changes. Assessment:   1. Trace aortic regurgitation. 2. Symptoms of orthostatic hypotension. 3. Baseline double vision and vertigo. 4. Persistent rare to occasional \"racing/fluttering\". 30-day monitor last year was negative. 5. Trouble with balance and weak legs. 6. Vertigo  7. Double vision  8. GERD  9. TIA  10. Hypertension, well controled at this time. Recommendations:  1. Continue to follow the cholesterol with Dr. Dodie Peabody. 2. Discontinue the amlodipine. 3. If she continues to have orthostatic symptoms I would decrease the losartan. 4. I will defer the vertigo to others. 5. She defers a repeat 30-day monitor. She also then defers a referral to electrophysiology.       Thank you for allowing me to participate in your patient's care.      [place] Nip DO  [facility] [place], [address] [facility]  Interventional Cardiology

## 2020-10-23 ENCOUNTER — HOSPITAL ENCOUNTER (OUTPATIENT)
Age: 80
Discharge: HOME | End: 2020-10-23
Payer: MEDICARE

## 2020-10-23 LAB
FERRITIN: 60 NG/ML
FOLATE: 6.9 NG/ML (ref 4.8–24.2)
IRON SATURATION: 37 % (ref 15–50)
IRON: 116 MCG/DL (ref 37–145)
SEDIMENTATION RATE, ERYTHROCYTE: 14 MM/HR (ref 0–20)
T4 FREE: 1.23 NG/DL (ref 0.93–1.7)
TOTAL IRON BINDING CAPACITY: 313 MCG/DL (ref 250–450)
VITAMIN B-12: 657 PG/ML (ref 211–946)

## 2020-10-23 PROCEDURE — 82746 ASSAY OF FOLIC ACID SERUM: CPT

## 2020-10-23 PROCEDURE — 83550 IRON BINDING TEST: CPT

## 2020-10-23 PROCEDURE — 84439 ASSAY OF FREE THYROXINE: CPT

## 2020-10-23 PROCEDURE — 82728 ASSAY OF FERRITIN: CPT

## 2020-10-23 PROCEDURE — 85651 RBC SED RATE NONAUTOMATED: CPT

## 2020-10-23 PROCEDURE — 36415 COLL VENOUS BLD VENIPUNCTURE: CPT

## 2020-10-23 PROCEDURE — 83540 ASSAY OF IRON: CPT

## 2020-10-23 PROCEDURE — 86038 ANTINUCLEAR ANTIBODIES: CPT

## 2020-10-23 PROCEDURE — 82607 VITAMIN B-12: CPT

## 2020-10-26 LAB — ANTI-NUCLEAR ANTIBODY (ANA): NEGATIVE

## 2021-01-11 ENCOUNTER — HOSPITAL ENCOUNTER (OUTPATIENT)
Dept: MAMMOGRAPHY | Age: 81
Discharge: HOME | End: 2021-01-13
Payer: MEDICARE

## 2021-01-11 DIAGNOSIS — Z12.31 ENCOUNTER FOR SCREENING MAMMOGRAM FOR MALIGNANT NEOPLASM OF BREAST: ICD-10-CM

## 2021-01-11 PROCEDURE — 77063 BREAST TOMOSYNTHESIS BI: CPT

## 2021-01-21 ENCOUNTER — IMMUNIZATION (OUTPATIENT)
Dept: PRIMARY CARE CLINIC | Age: 81
End: 2021-01-21
Payer: MEDICARE

## 2021-01-21 DIAGNOSIS — Z23 NEED FOR VACCINATION: Primary | ICD-10-CM

## 2021-01-21 PROCEDURE — 0001A COVID-19, PFIZER VACCINE 30MCG/0.3ML DOSE: CPT | Performed by: NURSE PRACTITIONER

## 2021-01-21 PROCEDURE — 91300 COVID-19, PFIZER VACCINE 30MCG/0.3ML DOSE: CPT | Performed by: NURSE PRACTITIONER

## 2021-02-11 ENCOUNTER — IMMUNIZATION (OUTPATIENT)
Dept: PRIMARY CARE CLINIC | Age: 81
End: 2021-02-11
Payer: MEDICARE

## 2021-02-11 PROCEDURE — 0002A COVID-19, PFIZER VACCINE 30MCG/0.3ML DOSE: CPT | Performed by: PHYSICIAN ASSISTANT

## 2021-02-11 PROCEDURE — 91300 COVID-19, PFIZER VACCINE 30MCG/0.3ML DOSE: CPT | Performed by: PHYSICIAN ASSISTANT

## 2021-03-17 ENCOUNTER — HOSPITAL ENCOUNTER (OUTPATIENT)
Age: 81
Discharge: HOME | End: 2021-03-17
Payer: MEDICARE

## 2021-03-17 LAB
ALBUMIN SERPL-MCNC: 4.5 G/DL (ref 3.5–5.2)
ALP BLD-CCNC: 86 U/L (ref 35–104)
ALT SERPL-CCNC: 11 U/L (ref 0–32)
ANION GAP SERPL CALCULATED.3IONS-SCNC: 12 MMOL/L (ref 7–16)
AST SERPL-CCNC: 19 U/L (ref 0–31)
BILIRUB SERPL-MCNC: 0.7 MG/DL (ref 0–1.2)
BUN BLDV-MCNC: 12 MG/DL (ref 8–23)
CALCIUM SERPL-MCNC: 9.5 MG/DL (ref 8.6–10.2)
CHLORIDE BLD-SCNC: 100 MMOL/L (ref 98–107)
CO2: 26 MMOL/L (ref 22–29)
CREAT SERPL-MCNC: 0.7 MG/DL (ref 0.5–1)
GFR AFRICAN AMERICAN: >60
GFR NON-AFRICAN AMERICAN: >60 ML/MIN/1.73
GLUCOSE BLD-MCNC: 91 MG/DL (ref 74–99)
HCT VFR BLD CALC: 39.7 % (ref 34–48)
HEMOGLOBIN: 13.1 G/DL (ref 11.5–15.5)
MCH RBC QN AUTO: 31 PG (ref 26–35)
MCHC RBC AUTO-ENTMCNC: 33 % (ref 32–34.5)
MCV RBC AUTO: 93.9 FL (ref 80–99.9)
PDW BLD-RTO: 13.1 FL (ref 11.5–15)
PLATELET # BLD: 263 E9/L (ref 130–450)
PMV BLD AUTO: 9.5 FL (ref 7–12)
POTASSIUM SERPL-SCNC: 3.7 MMOL/L (ref 3.5–5)
RBC # BLD: 4.23 E12/L (ref 3.5–5.5)
SODIUM BLD-SCNC: 138 MMOL/L (ref 132–146)
T4 FREE: 1.26 NG/DL (ref 0.93–1.7)
TOTAL PROTEIN: 7.5 G/DL (ref 6.4–8.3)
TSH SERPL DL<=0.05 MIU/L-ACNC: 2.94 UIU/ML (ref 0.27–4.2)
WBC # BLD: 6.8 E9/L (ref 4.5–11.5)

## 2021-03-17 PROCEDURE — 84439 ASSAY OF FREE THYROXINE: CPT

## 2021-03-17 PROCEDURE — 85027 COMPLETE CBC AUTOMATED: CPT

## 2021-03-17 PROCEDURE — 84443 ASSAY THYROID STIM HORMONE: CPT

## 2021-03-17 PROCEDURE — 36415 COLL VENOUS BLD VENIPUNCTURE: CPT

## 2021-03-17 PROCEDURE — 84590 ASSAY OF VITAMIN A: CPT

## 2021-03-17 PROCEDURE — 80053 COMPREHEN METABOLIC PANEL: CPT

## 2021-03-21 LAB
RETINYL PALMITATE: 0.02 MG/L (ref 0–0.1)
VITAMIN A LEVEL: 0.54 MG/L (ref 0.3–1.2)
VITAMIN A, INTERP: NORMAL

## 2021-06-02 DIAGNOSIS — M81.0 SENILE OSTEOPOROSIS: ICD-10-CM

## 2021-06-02 RX ORDER — DIPHENHYDRAMINE HYDROCHLORIDE 50 MG/ML
50 INJECTION INTRAMUSCULAR; INTRAVENOUS ONCE
Status: CANCELLED | OUTPATIENT
Start: 2021-06-02 | End: 2021-06-02

## 2021-06-02 RX ORDER — EPINEPHRINE 1 MG/ML
0.3 INJECTION, SOLUTION, CONCENTRATE INTRAVENOUS PRN
Status: CANCELLED | OUTPATIENT
Start: 2021-06-02

## 2021-06-02 RX ORDER — SODIUM CHLORIDE 9 MG/ML
INJECTION, SOLUTION INTRAVENOUS CONTINUOUS
Status: CANCELLED | OUTPATIENT
Start: 2021-06-02

## 2021-06-02 RX ORDER — METHYLPREDNISOLONE SODIUM SUCCINATE 125 MG/2ML
125 INJECTION, POWDER, LYOPHILIZED, FOR SOLUTION INTRAMUSCULAR; INTRAVENOUS ONCE
Status: CANCELLED | OUTPATIENT
Start: 2021-06-02 | End: 2021-06-02

## 2021-06-05 ENCOUNTER — HOSPITAL ENCOUNTER (OUTPATIENT)
Age: 81
Discharge: HOME | End: 2021-06-05
Payer: MEDICARE

## 2021-06-05 LAB
ANION GAP SERPL CALCULATED.3IONS-SCNC: 10 MMOL/L (ref 7–16)
BUN BLDV-MCNC: 11 MG/DL (ref 6–23)
CALCIUM SERPL-MCNC: 8.7 MG/DL (ref 8.6–10.2)
CHLORIDE BLD-SCNC: 104 MMOL/L (ref 98–107)
CO2: 24 MMOL/L (ref 22–29)
CREAT SERPL-MCNC: 0.7 MG/DL (ref 0.5–1)
GFR AFRICAN AMERICAN: >60
GFR NON-AFRICAN AMERICAN: >60 ML/MIN/1.73
GLUCOSE BLD-MCNC: 85 MG/DL (ref 74–99)
POTASSIUM SERPL-SCNC: 3.7 MMOL/L (ref 3.5–5)
SODIUM BLD-SCNC: 138 MMOL/L (ref 132–146)

## 2021-06-05 PROCEDURE — 80048 BASIC METABOLIC PNL TOTAL CA: CPT

## 2021-06-05 PROCEDURE — 36415 COLL VENOUS BLD VENIPUNCTURE: CPT

## 2021-06-14 ENCOUNTER — HOSPITAL ENCOUNTER (OUTPATIENT)
Dept: INFUSION THERAPY | Age: 81
Setting detail: INFUSION SERIES
Discharge: HOME | End: 2021-06-14
Payer: MEDICARE

## 2021-06-14 VITALS
OXYGEN SATURATION: 98 % | DIASTOLIC BLOOD PRESSURE: 72 MMHG | HEART RATE: 79 BPM | RESPIRATION RATE: 16 BRPM | TEMPERATURE: 98.1 F | SYSTOLIC BLOOD PRESSURE: 149 MMHG

## 2021-06-14 DIAGNOSIS — M81.0 SENILE OSTEOPOROSIS: Primary | ICD-10-CM

## 2021-06-14 PROCEDURE — 96372 THER/PROPH/DIAG INJ SC/IM: CPT

## 2021-06-14 PROCEDURE — 6360000002 HC RX W HCPCS: Performed by: FAMILY MEDICINE

## 2021-06-14 RX ORDER — METHYLPREDNISOLONE SODIUM SUCCINATE 125 MG/2ML
125 INJECTION, POWDER, LYOPHILIZED, FOR SOLUTION INTRAMUSCULAR; INTRAVENOUS ONCE
Status: CANCELLED | OUTPATIENT
Start: 2021-12-13 | End: 2021-12-13

## 2021-06-14 RX ORDER — DIPHENHYDRAMINE HYDROCHLORIDE 50 MG/ML
50 INJECTION INTRAMUSCULAR; INTRAVENOUS ONCE
Status: CANCELLED | OUTPATIENT
Start: 2021-12-13 | End: 2021-12-13

## 2021-06-14 RX ORDER — SODIUM CHLORIDE 9 MG/ML
INJECTION, SOLUTION INTRAVENOUS CONTINUOUS
Status: CANCELLED | OUTPATIENT
Start: 2021-12-13

## 2021-06-14 RX ORDER — EPINEPHRINE 1 MG/ML
0.3 INJECTION, SOLUTION, CONCENTRATE INTRAVENOUS PRN
Status: CANCELLED | OUTPATIENT
Start: 2021-12-13

## 2021-06-14 RX ADMIN — DENOSUMAB 60 MG: 60 INJECTION SUBCUTANEOUS at 10:59

## 2021-06-14 ASSESSMENT — PAIN DESCRIPTION - PAIN TYPE: TYPE: CHRONIC PAIN

## 2021-06-14 ASSESSMENT — PAIN DESCRIPTION - ORIENTATION: ORIENTATION: RIGHT;LOWER

## 2021-06-14 ASSESSMENT — PAIN SCALES - GENERAL: PAINLEVEL_OUTOF10: 5

## 2021-06-14 ASSESSMENT — PAIN DESCRIPTION - LOCATION: LOCATION: BACK;KNEE;SHOULDER

## 2021-08-17 ENCOUNTER — HOSPITAL ENCOUNTER (OUTPATIENT)
Dept: ULTRASOUND IMAGING | Age: 81
Discharge: HOME | End: 2021-08-19
Payer: MEDICARE

## 2021-08-17 DIAGNOSIS — N39.0 URINARY TRACT INFECTION WITHOUT HEMATURIA, SITE UNSPECIFIED: ICD-10-CM

## 2021-08-17 DIAGNOSIS — N32.81 DETRUSOR INSTABILITY OF BLADDER: ICD-10-CM

## 2021-08-17 DIAGNOSIS — N28.1 ACQUIRED CYST OF KIDNEY: ICD-10-CM

## 2021-08-17 PROCEDURE — 76770 US EXAM ABDO BACK WALL COMP: CPT

## 2021-08-18 ENCOUNTER — OFFICE VISIT (OUTPATIENT)
Dept: PAIN MANAGEMENT | Age: 81
End: 2021-08-18
Payer: MEDICARE

## 2021-08-18 VITALS
DIASTOLIC BLOOD PRESSURE: 68 MMHG | WEIGHT: 123 LBS | HEART RATE: 80 BPM | TEMPERATURE: 98.4 F | RESPIRATION RATE: 16 BRPM | HEIGHT: 63 IN | BODY MASS INDEX: 21.79 KG/M2 | SYSTOLIC BLOOD PRESSURE: 128 MMHG | OXYGEN SATURATION: 98 %

## 2021-08-18 DIAGNOSIS — M53.3 SACROILIAC DYSFUNCTION: Primary | ICD-10-CM

## 2021-08-18 DIAGNOSIS — M47.817 LUMBOSACRAL SPONDYLOSIS WITHOUT MYELOPATHY: ICD-10-CM

## 2021-08-18 DIAGNOSIS — M48.061 SPINAL STENOSIS OF LUMBAR REGION, UNSPECIFIED WHETHER NEUROGENIC CLAUDICATION PRESENT: ICD-10-CM

## 2021-08-18 DIAGNOSIS — M70.60 TROCHANTERIC BURSITIS, UNSPECIFIED LATERALITY: ICD-10-CM

## 2021-08-18 DIAGNOSIS — M54.16 LUMBAR RADICULOPATHY: ICD-10-CM

## 2021-08-18 DIAGNOSIS — M51.36 DDD (DEGENERATIVE DISC DISEASE), LUMBAR: ICD-10-CM

## 2021-08-18 PROCEDURE — 1090F PRES/ABSN URINE INCON ASSESS: CPT | Performed by: PHYSICIAN ASSISTANT

## 2021-08-18 PROCEDURE — 1123F ACP DISCUSS/DSCN MKR DOCD: CPT | Performed by: PHYSICIAN ASSISTANT

## 2021-08-18 PROCEDURE — G8399 PT W/DXA RESULTS DOCUMENT: HCPCS | Performed by: PHYSICIAN ASSISTANT

## 2021-08-18 PROCEDURE — G8420 CALC BMI NORM PARAMETERS: HCPCS | Performed by: PHYSICIAN ASSISTANT

## 2021-08-18 PROCEDURE — G8427 DOCREV CUR MEDS BY ELIG CLIN: HCPCS | Performed by: PHYSICIAN ASSISTANT

## 2021-08-18 PROCEDURE — 99213 OFFICE O/P EST LOW 20 MIN: CPT | Performed by: PHYSICIAN ASSISTANT

## 2021-08-18 PROCEDURE — 1036F TOBACCO NON-USER: CPT | Performed by: PHYSICIAN ASSISTANT

## 2021-08-18 PROCEDURE — 4040F PNEUMOC VAC/ADMIN/RCVD: CPT | Performed by: PHYSICIAN ASSISTANT

## 2021-08-18 NOTE — PROGRESS NOTES
[facility] Pain Management  [address]  [facility]    Follow up Note      Hazel Shields     Date of Visit:  2021    CC:  Patient presents for follow up   Chief Complaint   Patient presents with    Follow-up     low back        HPI:    Pain is worse. Injection helped until 4-5 days ago. Now having symptoms return to her sacral area bilaterally but worse on the left side. Appropriate analgesia with current medications regimen: Not applicable. Change in quality of symptoms:no. Medication side effects:not applicable . Recent diagnostic testing:none. Recent interventional procedures:  10/12/2020 Procedure(s):  BILATERAL SACROILIAC JOINT INJECTION UNDER FLUOROSCOPY - 80-90% relief x 10 months. Pain returned 4-5 days ago. 2021 PROCEDURE PERFORMED: Right Greater trochanter bursa steroid injection - not helpful. She has been on anticoagulation medications to include Plavix.     The patient  has not been on herbal supplements.       The patient is not diabetic.     Imaging studies:  Imagin/2019 lumbar MRI:  1. Degenerative spondylotic changes. 2. T12-L1: Midline to left-sided disc protrusion encroaching on the   left lateral recess. 3. L1-2: Right lateral to far lateral disc protrusion encroaching   moderately on the right lateral recess and also encroaching on the   right intervertebral foramen mildly crowding the exiting right L1   nerve root. 4. L2-3: Disc bulging with mild to moderate canalicular stenosis and   also encroachment on the right intervertebral foramen mildly crowding   the exiting right L2 nerve root.      2019 Right hip x-ray     Patient MRN:  [id number]   : 1940   Age: 66 years   Gender: Female   Order Date:  2019 11:00 AM   EXAM: XR HIP 2-3 VW W PELVIS RIGHT   NUMBER OF IMAGES:  3 views   INDICATION:  worsening chronic pain    worsening chronic pain   COMPARISON: None       .   The bones appear to be in anatomic alignment. BILATERAL SACROILIAC JOINT INJECTION UNDER FLUOROSCOPY performed by Jessi Chavez MD at [facility]  09/26/2017       Prior to Admission medications    Medication Sig Start Date End Date Taking? Authorizing Provider   Multiple Vitamins-Minerals (PRESERVISION AREDS 2 PO) Take by mouth   Yes Historical Provider, MD   simvastatin (ZOCOR) 20 MG tablet 20 mg nightly  8/1/16  Yes Historical Provider, MD   losartan (COZAAR) 50 MG tablet Take 50 mg by mouth daily  8/21/16  Yes Historical Provider, MD   Biotin 1000 MCG TABS Take 1 tablet by mouth daily    Yes Historical Provider, MD   Coenzyme Q10 (CO Q 10) 100 MG CAPS Take 200 mg by mouth daily   Yes Historical Provider, MD   Cholecalciferol (VITAMIN D3) 3000 UNITS TABS Take 5,000 Units by mouth daily    Yes Historical Provider, MD   acetaminophen (TYLENOL) 500 MG tablet Take 500 mg by mouth every 6 hours as needed for Pain   Yes Historical Provider, MD   clopidogrel (PLAVIX) 75 MG tablet Take 75 mg by mouth daily.      Yes Historical Provider, MD       No Known Allergies    Social History     Socioeconomic History    Marital status:      Spouse name: Not on file    Number of children: Not on file    Years of education: Not on file    Highest education level: Not on file   Occupational History    Occupation: retired   Tobacco Use    Smoking status: Never Smoker    Smokeless tobacco: Never Used   Vaping Use    Vaping Use: Never used   Substance and Sexual Activity    Alcohol use: No     Alcohol/week: 0.0 standard drinks    Drug use: No    Sexual activity: Not on file   Other Topics Concern    Not on file   Social History Narrative    Not on file     Social Determinants of Health     Financial Resource Strain:     Difficulty of Paying Living Expenses:    Food Insecurity:     Worried About [address] in the Last Year:     [address] in the Last Year:    Transportation Needs:     Lack of Extremities:    Tremors:None bilaterally upper and lower  Range of motion:Generally normal shoulders, pain with internal rotation of hips not done. Intact:Yes  Edema:Normal    Neurological:    Sensory:normal to light touch   Motor:   Right Quadriceps5/5          Left Quadriceps5/5           Right Gastrocnemius5/5    Left Gastrocnemius5/5  Right Ant Tibialis5/5  Left Ant Tibialis5/5  [address]    Dermatology:    Skin:no unusual rashes, no skin lesions, no palpable subcutaneous nodules and good skin turgor    Impression:      Diagnosis Orders   1. DDD (degenerative disc disease), lumbar      2. Lumbosacral spondylosis without myelopathy      3. Sacroiliac dysfunction      4. Spinal stenosis of lumbar region without neurogenic claudication      5. Primary osteoarthritis of right hip      6. Trochanteric bursitis, unspecified laterality            [de-identified] y.o.  F with h/o chronic low back and Lower extremity pain .     Failed conservative treatment.  Has done PT in Jan / Feb 2019. Continues HEP.     MRI changes noted - significant changes at L1-2.     Is on Plavix for h/o TIA.     Failed gabapentin trial.     Does not want narcotics.     Patient is s/p: b/l SIJ injection under fluoroscopy +  under fluoroscopic on 10/12/2020 with 80-90% relief x 10 months. Pain has returned 4-5 days ago. Repeat ordered. Discussed at length.     Can continue Plavix for SIJ injection.      Right GT hip injection was not helpful.        If her radicular pain persists, will consider LESI L1/2 or L2-3 (paramedian approach)- but for this she needs to be off Plavix for 7 days prior.      Continue HEP.     Has used Compound cream for local use which caused her to Ramiro wilson. \"  No longer using.                        We discussed with the patient that combining opioids, benzodiazepines, alcohol, illicit drugs or sleep aids increases the risk of respiratory depression including death.  We discussed that these medications may cause drowsiness, sedation or dizziness and have counseled the patient not to drive or operate machinery. We have discussed that these medications will be prescribed only by one provider. We have discussed with the patient about age related risk factors and have thoroughly discussed the importance of taking these medications as prescribed. The patient verbalizes understanding.     ccreferring physic

## 2021-08-18 NOTE — PROGRESS NOTES
Do you currently have any of the following:    Fever: No  Headache:  No  Cough: No  Shortness of breath: No  Exposed to anyone with these symptoms: Machelle Shields presents to the [address] [place] [address] on 8/18/2021. [facility] is complaining of pain low back  The pain is intermittent. The pain is described as aching and throbbing. Pain is rated on her best day at a 6, on her worst day at a 8, and on average at a 6 on the VAS scale. She took her last dose of      Any procedures since your last visit:     Pacemaker or defibrillator: No managed by     She is not on NSAIDS and is  on anticoagulation medications to include Plavix and is managed by PCP    Medication Contract and Consent for Opioid Use Documents Filed      No documents found                /68   Pulse 80   Temp 98.4 °F (36.9 °C) (Infrared)   Resp 16   Ht 5' 3\" (1.6 m)   Wt 123 lb (55.8 kg)   SpO2 98%   BMI 21.79 kg/m²      No LMP recorded.  Patient is postmenopausal.

## 2021-08-20 ENCOUNTER — TELEPHONE (OUTPATIENT)
Dept: PAIN MANAGEMENT | Age: 81
End: 2021-08-20

## 2021-08-20 RX ORDER — DENOSUMAB 60 MG/ML
60 INJECTION SUBCUTANEOUS
COMMUNITY

## 2021-08-20 NOTE — TELEPHONE ENCOUNTER
8-20-21-call to Massiel Long to change her procedure date to 8-23-21. She is agreeable and advised her she does not need to hold her Plavix for the procedure, per Dr Charline Krishnamurthy.     Cynthia Renee RN  Pain Management

## 2021-08-23 ENCOUNTER — HOSPITAL ENCOUNTER (OUTPATIENT)
Age: 81
Setting detail: OUTPATIENT SURGERY
Discharge: HOME | End: 2021-08-23
Attending: ANESTHESIOLOGY | Admitting: ANESTHESIOLOGY
Payer: MEDICARE

## 2021-08-23 ENCOUNTER — HOSPITAL ENCOUNTER (OUTPATIENT)
Dept: GENERAL RADIOLOGY | Age: 81
Discharge: HOME | End: 2021-08-25
Attending: ANESTHESIOLOGY
Payer: MEDICARE

## 2021-08-23 VITALS
DIASTOLIC BLOOD PRESSURE: 84 MMHG | OXYGEN SATURATION: 100 % | RESPIRATION RATE: 16 BRPM | BODY MASS INDEX: 22.15 KG/M2 | TEMPERATURE: 98 F | WEIGHT: 125 LBS | HEIGHT: 63 IN | SYSTOLIC BLOOD PRESSURE: 149 MMHG | HEART RATE: 89 BPM

## 2021-08-23 DIAGNOSIS — R52 PAIN: ICD-10-CM

## 2021-08-23 PROCEDURE — 3209999900 FLUORO FOR SURGICAL PROCEDURES

## 2021-08-23 PROCEDURE — 27096 INJECT SACROILIAC JOINT: CPT | Performed by: ANESTHESIOLOGY

## 2021-08-23 PROCEDURE — 6360000004 HC RX CONTRAST MEDICATION: Performed by: ANESTHESIOLOGY

## 2021-08-23 PROCEDURE — 7100000010 HC PHASE II RECOVERY - FIRST 15 MIN: Performed by: ANESTHESIOLOGY

## 2021-08-23 PROCEDURE — 7100000011 HC PHASE II RECOVERY - ADDTL 15 MIN: Performed by: ANESTHESIOLOGY

## 2021-08-23 PROCEDURE — 6360000002 HC RX W HCPCS: Performed by: ANESTHESIOLOGY

## 2021-08-23 PROCEDURE — 3600000002 HC SURGERY LEVEL 2 BASE: Performed by: ANESTHESIOLOGY

## 2021-08-23 PROCEDURE — 2709999900 HC NON-CHARGEABLE SUPPLY: Performed by: ANESTHESIOLOGY

## 2021-08-23 PROCEDURE — 2500000003 HC RX 250 WO HCPCS: Performed by: ANESTHESIOLOGY

## 2021-08-23 RX ORDER — BUPIVACAINE HYDROCHLORIDE 2.5 MG/ML
INJECTION, SOLUTION EPIDURAL; INFILTRATION; INTRACAUDAL PRN
Status: DISCONTINUED | OUTPATIENT
Start: 2021-08-23 | End: 2021-08-23 | Stop reason: ALTCHOICE

## 2021-08-23 RX ORDER — METHYLPREDNISOLONE ACETATE 40 MG/ML
INJECTION, SUSPENSION INTRA-ARTICULAR; INTRALESIONAL; INTRAMUSCULAR; SOFT TISSUE PRN
Status: DISCONTINUED | OUTPATIENT
Start: 2021-08-23 | End: 2021-08-23 | Stop reason: ALTCHOICE

## 2021-08-23 RX ORDER — LIDOCAINE HYDROCHLORIDE 5 MG/ML
INJECTION, SOLUTION INFILTRATION; INTRAVENOUS PRN
Status: DISCONTINUED | OUTPATIENT
Start: 2021-08-23 | End: 2021-08-23 | Stop reason: ALTCHOICE

## 2021-08-23 ASSESSMENT — PAIN DESCRIPTION - LOCATION: LOCATION: BACK;HIP

## 2021-08-23 ASSESSMENT — PAIN SCALES - GENERAL: PAINLEVEL_OUTOF10: 6

## 2021-08-23 ASSESSMENT — PAIN DESCRIPTION - PAIN TYPE: TYPE: CHRONIC PAIN

## 2021-08-23 ASSESSMENT — PAIN DESCRIPTION - ORIENTATION: ORIENTATION: RIGHT;LEFT

## 2021-08-23 ASSESSMENT — PAIN DESCRIPTION - DESCRIPTORS: DESCRIPTORS: ACHING

## 2021-08-23 NOTE — OP NOTE
Operative Note      Patient: Jose Miguel Gabriel  YOB: 1940  MRN: [id number]    Date of Procedure: 2021    Pre-Op Diagnosis: SACRUM DYSFUNCTION    Post-Op Diagnosis: Same       Procedure(s):  BILATERAL SACROILLAC JOINT INJECTIONS    Surgeon(s):  Sherrie Merchant MD    Assistant:   * No surgical staff found *    Anesthesia: Local    Estimated Blood Loss (mL): Minimal    Complications: None    Specimens:   * No specimens in log *    Implants:  * No implants in log *      Drains: * No LDAs found *    Findings: good needle placement    Detailed Description of Procedure:   2021    Patient: Jose Miguel Gabriel  :  1940  Age:  [de-identified] y.o. Sex:  female     PRE-OPERATIVE DIAGNOSIS:    Sacroiliitis, somatic dysfunction of the lumbosacral spine. POST-OPERATIVE DIAGNOSIS: Same. PROCEDURE:  Fluoroscopic guided Bilateral   sacroiliac joint injection with steroid. SURGEON:  Sherrie Merchant MD    ANESTHESIA: Local    ESTIMATED BLOOD LOSS: None.  ______________________________________________________________________  BRIEF HISTORY:  Jose Miguel Gabriel comes in today for  Bilateral sacroiliac joint injection under fluoroscopic guidance. The potential complications as well as the procedure in detail were explained to her today. She has elected to undergo the aforementioned procedure. Matilde's complete History & Physical examination were reviewed in depth, a copy of which is in the chart. DESCRIPTION OF PROCEDURE:    After confirming written and informed consent, a time-out was performed and Regis name and date of birth, the procedure to be performed as well as the plan for the location of the needle insertion were confirmed. The patient was brought into the procedure room and placed in the prone position on the fluoroscopy table. Standard monitors were placed and vital signs were observed throughout the procedure.  The low back and upper buttocks area was prepped with chloraprep and draped in a sterile manner. AP fluoroscopy was used to visualize the sacroiliac joint. The fluoroscopic beam was then obliqued until the anterior and posterior margins of the joint were aligned. The inferior margin of the joint was identified and marked. The skin and subcutaneous tissue about this identified point were anesthestized with 0.5% lidocaine. Two # 22 gauge 3-1/2 spinal needle was advanced toward the the identified point under fluoroscopic guidance. Once the targeted point was reached and the joint space was entered, negative aspiration was confirmed, and 0.5 cc of 240 omnipaque was injected. The  Joint space was appropriately outlined. Then, after negative aspiration, a solution consisiting of 0.25% marcaine 2 cc and 30 mg DepoMedrol was easily injected on each side. The needle was then removed and the needle insertion site was covered with Band-Aid. Disposition the patient tolerated the procedure well and there were no complications . Vital signs remained stable throughout the procedure. The patient was escorted to the recovery area where they remained until discharge and written discharge instructions for the procedure were given. Plan: [place] will return to our pain management center as scheduled.      Ernie Webb MD

## 2021-08-23 NOTE — H&P
[facility]  [address]  Dept: [phone]    Procedure History & Physical      Rosenda Wiggins     HPI:    Patient  is here for SIJ injection  for low back pain. Labs/imaging studies reviewed   All question and concerns addressed including R/B/A associated with the procedure    Past Medical History:   Diagnosis Date    Arthritis     History of double vision     both  eyes    Hyperlipidemia     Hypertension     Joint pain     Low back pain     PONV (postoperative nausea and vomiting)     Raynaud's disease     TIA (transient ischemic attack) 2012    no deficits, taking Plavix        Past Surgical History:   Procedure Laterality Date    APPENDECTOMY      COLONOSCOPY  09/26/2017    EYE SURGERY Bilateral     cataract removal w lense implants    HC INJECT OTHER PERPHRL NERV Bilateral 9/12/2019    BILATERAL SACROILIAC JOINT INJECTION performed by Corinne Clarity, MD at [address] [address] Bilateral 10/12/2020    BILATERAL SACROILIAC JOINT INJECTION UNDER FLUOROSCOPY performed by Corinne Clarity, MD at [address]  09/26/2017       Prior to Admission medications    Medication Sig Start Date End Date Taking?  Authorizing Provider   Multiple Vitamins-Minerals (PRESERVISION AREDS 2 PO) Take by mouth   Yes Historical Provider, MD   simvastatin (ZOCOR) 20 MG tablet 20 mg nightly  8/1/16  Yes Historical Provider, MD   losartan (COZAAR) 50 MG tablet Take 50 mg by mouth daily  8/21/16  Yes Historical Provider, MD   Biotin 1000 MCG TABS Take 1 tablet by mouth daily    Yes Historical Provider, MD   Coenzyme Q10 (CO Q 10) 100 MG CAPS Take 200 mg by mouth daily   Yes Historical Provider, MD   Cholecalciferol (VITAMIN D3) 3000 UNITS TABS Take 5,000 Units by mouth daily    Yes Historical Provider, MD   acetaminophen (TYLENOL) 500 MG tablet Take 500 mg by mouth every 6 hours as needed for Pain   Yes Historical Provider, MD   clopidogrel (PLAVIX) 75 MG tablet Take 75 mg by mouth daily. Yes Historical Provider, MD   denosumab (PROLIA) 60 MG/ML SOSY SC injection Inject 60 mg into the skin once 2021    Historical Provider, MD       No Known Allergies    Social History     Socioeconomic History    Marital status:      Spouse name: Not on file    Number of children: Not on file    Years of education: Not on file    Highest education level: Not on file   Occupational History    Occupation: retired   Tobacco Use    Smoking status: Never Smoker    Smokeless tobacco: Never Used   Vaping Use    Vaping Use: Never used   Substance and Sexual Activity    Alcohol use: No     Alcohol/week: 0.0 standard drinks    Drug use: No    Sexual activity: Not on file   Other Topics Concern    Not on file   Social History Narrative    Not on file     Social Determinants of Health     Financial Resource Strain:     Difficulty of Paying Living Expenses:    Food Insecurity:     Worried About [address] in the Last Year:     [address] [employer] in the Last Year:    Transportation Needs:     Lack of Transportation (Medical):      Lack of Transportation (Non-Medical):    Physical Activity:     Days of Exercise per Week:     Minutes of Exercise per Session:    Stress:     Feeling of Stress :    Social Connections:     Frequency of Communication with Friends and Family:     Frequency of Social Gatherings with Friends and Family:     Attends [employer] Services:     Active Member of Clubs or Organizations:     Attends Club or Organization Meetings:     Marital Status:    Intimate Partner Violence:     Fear of Current or Ex-Partner:     Emotionally Abused:     Physically Abused:     Sexually Abused:        Family History   Problem Relation Age of Onset    Cancer Mother     Heart Disease Father     Heart Attack Father 52         MI         REVIEW OF SYSTEMS:    CONSTITUTIONAL:  negative for fevers, chills, sweats and fatigue    RESPIRATORY:  negative for  dry cough, cough with sputum, dyspnea, wheezing and chest pain    CARDIOVASCULAR:  negative for chest pain, dyspnea, palpitations, syncope    GASTROINTESTINAL:  negative for nausea, vomiting, change in bowel habits, diarrhea, constipation and abdominal pain    MUSCULOSKELETAL: negative for muscle weakness    SKIN: negative for itching or rashes. BEHAVIOR/PSYCH:  negative for poor appetite, increased appetite, decreased sleep and poor concentration    All other systems negative      PHYSICAL EXAM:    VITALS:  BP (!) 154/72   Pulse 96   Temp 98 °F (36.7 °C) (Oral)   Resp 18   Ht 5' 3\" (1.6 m)   Wt 125 lb (56.7 kg)   SpO2 100%   BMI 22.14 kg/m²     CONSTITUTIONAL:  awake, alert, cooperative, no apparent distress, and appears stated age    EYES: PERRLA, EOMI    LUNGS:  No increased work of breathing, no audible wheezing    CARDIOVASCULAR:  regular rate and rhythm    ABDOMEN:  Soft non tender non distended     EXTREMITIES: no signs of clubbing or cyanosis. MUSCULOSKELETAL: negative for flaccid muscle tone or spastic movements. SKIN: gross examination reveals no signs of rashes, or diaphoresis. NEURO: Cranial nerves II-XII grossly intact. No signs of agitated mood. Assessment/Plan:    Patient  is here for SIJ injection  for low back pain. The patient was counseled at length about the risks of ashley Covid-19 during their perioperative period and any recovery window from their procedure. The patient was made aware that ashley Covid-19  may worsen their prognosis for recovering from their procedure  and lend to a higher morbidity and/or mortality risk. All material risks, benefits, and reasonable alternatives including postponing the procedure were discussed. The patient does wish to proceed with the procedure at this time.       Atul Her MD

## 2021-09-14 NOTE — PROGRESS NOTES
[facility]  [address]  [facility], [facility]    Follow up Note      Justice Has     Date of Visit:  9/15/2021    CC:  Patient presents for follow up   Chief Complaint   Patient presents with    Follow-up     low back       HPI:    Pain is slightly better after the injection, but not as helpful as last time. C/o right knee pain. Appropriate analgesia with current medications regimen: Not applicable. Change in quality of symptoms:no. Medication side effects:not applicable . Recent diagnostic testing:none. Recent interventional procedures: 2021 Procedure(s):  BILATERAL SACROILLAC JOINT INJECTIONS - only helped slightly. Lower back is not \"freezing up anymore. \"    Jettie Sen[place]e been on anticoagulation medications to include Plavix.     The patient  has not been on herbal supplements.       The patient is not diabetic.     Imaging studies:  Imagin/2019 lumbar MRI:  1. Degenerative spondylotic changes. 2. T12-L1: Midline to left-sided disc protrusion encroaching on the   left lateral recess. 3. L1-2: Right lateral to far lateral disc protrusion encroaching   moderately on the right lateral recess and also encroaching on the   right intervertebral foramen mildly crowding the exiting right L1   nerve root. 4. L2-3: Disc bulging with mild to moderate canalicular stenosis and   also encroachment on the right intervertebral foramen mildly crowding   the exiting right L2 nerve root.      2019 Right hip x-ray     Patient MRN:  [id number]   : 1940   Age: 66 years   Gender: Female   Order Date:  2019 11:00 AM   EXAM: XR HIP 2-3 VW W PELVIS RIGHT   NUMBER OF IMAGES:  3 views   INDICATION:  worsening chronic pain    worsening chronic pain   COMPARISON: None       .   The bones appear to be in anatomic alignment.     No foreign body is identified   No fracture is identified     There is moderate joint space loss    The are moderate degenerative changes present          Impression   Moderate degenerative changes      2019 Lumbar Spine X-ray     Patient MRN:  [id number]   : 1940   Age: 66 years   Gender: Female   Order Date:  2019 11:00 AM   EXAM: XR LUMBAR SPINE (MIN 4 VIEWS)   NUMBER OF IMAGES:  5 views   INDICATION:  worsening Chronic low back pain    worsening Chronic low back pain   COMPARISON: None           Alignment of the vertebral bodies appears to be near-anatomic   No fracture or foreign body is identified. The disc spaces demonstrate severe degenerative changes. There is mild loss of the vertebral body height   The are severe degenerative changes involving the facets.           Impression   Findings consistent with degenerative disc disease and   degenerative facets disease                                            Potential Aberrant Drug-Related Behavior:  no     Urine Drug Screening: no     OARRS report:Reviewed on:   2019- not on chronic narcotics. 2020: Consistent  2020: Consistent    2021:  Consistent  09/15/2021:  Consistent     Opioid Agreement:  Date enacted:  No opioids initiated.    Renewal date:    Past Medical History:   Diagnosis Date    Arthritis     History of double vision     both  eyes    Hyperlipidemia     Hypertension     Joint pain     Low back pain     PONV (postoperative nausea and vomiting)     Raynaud's disease     TIA (transient ischemic attack)     no deficits, taking Plavix        Past Surgical History:   Procedure Laterality Date    APPENDECTOMY      COLONOSCOPY  2017    EYE SURGERY Bilateral     cataract removal w lense implants    HC INJECT OTHER PERPHRL NERV Bilateral 2019    BILATERAL SACROILIAC JOINT INJECTION performed by Nneka Torres MD at [address] [address] Bilateral 10/12/2020    BILATERAL SACROILIAC JOINT INJECTION UNDER FLUOROSCOPY performed by Nneka Torres MD at [facility] Bilateral 8/23/2021    BILATERAL SACROILLAC JOINT INJECTIONS  performed by Sherrie Merchant MD at [facility]  09/26/2017       Prior to Admission medications    Medication Sig Start Date End Date Taking? Authorizing Provider   gabapentin (NEURONTIN) 100 MG capsule QD x 1 week, BID x 1 week, TID thereafter. 9/15/21 10/15/21 Yes Dawson Buerger, PA   denosumab (PROLIA) 60 MG/ML SOSY SC injection Inject 60 mg into the skin once 06/2021   Yes Historical Provider, MD   Multiple Vitamins-Minerals (PRESERVISION AREDS 2 PO) Take by mouth   Yes Historical Provider, MD   simvastatin (ZOCOR) 20 MG tablet 20 mg nightly  8/1/16  Yes Historical Provider, MD   losartan (COZAAR) 50 MG tablet Take 50 mg by mouth daily  8/21/16  Yes Historical Provider, MD   Biotin 1000 MCG TABS Take 1 tablet by mouth daily    Yes Historical Provider, MD   Coenzyme Q10 (CO Q 10) 100 MG CAPS Take 200 mg by mouth daily   Yes Historical Provider, MD   Cholecalciferol (VITAMIN D3) 3000 UNITS TABS Take 5,000 Units by mouth daily    Yes Historical Provider, MD   acetaminophen (TYLENOL) 500 MG tablet Take 500 mg by mouth every 6 hours as needed for Pain   Yes Historical Provider, MD   clopidogrel (PLAVIX) 75 MG tablet Take 75 mg by mouth daily.      Yes Historical Provider, MD       No Known Allergies    Social History     Socioeconomic History    Marital status:      Spouse name: Not on file    Number of children: Not on file    Years of education: Not on file    Highest education level: Not on file   Occupational History    Occupation: retired   Tobacco Use    Smoking status: Never Smoker    Smokeless tobacco: Never Used   Vaping Use    Vaping Use: Never used   Substance and Sexual Activity    Alcohol use: No     Alcohol/week: 0.0 standard drinks    Drug use: No    Sexual activity: Not on file   Other Topics Concern    Not on file   Social History Narrative    Not on file     Social Determinants of Health Financial Resource Strain:     Difficulty of Paying Living Expenses:    Food Insecurity:     Worried About Running Out of Food in the Last Year:     [address] in the Last Year:    Transportation Needs:     Lack of Transportation (Medical):  Lack of Transportation (Non-Medical):    Physical Activity:     Days of Exercise per Week:     Minutes of Exercise per Session:    Stress:     Feeling of Stress :    Social Connections:     Frequency of Communication with Friends and Family:     Frequency of Social Gatherings with Friends and Family:     Attends Gnosticism Services:     Active Member of Clubs or Organizations:     Attends Club or Organization Meetings:     Marital Status:    Intimate Partner Violence:     Fear of Current or Ex-Partner:     Emotionally Abused:     Physically Abused:     Sexually Abused:        Family History   Problem Relation Age of Onset    Cancer Mother     Heart Disease Father     Heart Attack Father 52         MI       REVIEW OF SYSTEMS:     Marlena Frias denies fever/chills, chest pain, shortness of breath, new bowel or bladder complaints. All other review of systems was negative. PHYSICAL EXAMINATION:      /72   Pulse 74   Temp 94.3 °F (34.6 °C) (Infrared)   Resp 16   Ht 5' 3\" (1.6 m)   Wt 125 lb (56.7 kg)   SpO2 98%   BMI 22.14 kg/m²     General:      General appearance:   pleasant and well-hydrated. , in mild discomfort and A & O x3  Build:Normal Weight    HEENT:    Head:normocephalic and atraumatic  Sclera: icterus absent,    Lungs:    Breathing:Normal expansion. No wheezing. Abdomen:    Shape:non-distended and normal    Lumbar spine:    CVA tenderness:No   Palpation:tenderness paravertebral muscles, midline tenderness, facet loading, right, positive and tenderness. Range of motion:abnormal moderately Lateral bending, flexion, extension rotation bilateral and is painful.     Musculoskeletal:    SI joint tenderness:positive right, positive left              INNA test:not done right, not done left  Piriformis tenderness:negative right, negative left    Extremities:    Tremors:None bilaterally upper and lower  Range of motion:Generally normal shoulders, pain with internal rotation of hips not done. Intact:Yes  Edema:Normal    Neurological:    Sensory:normal to light touch   Motor:   [address]    Dermatology:    Skin:no unusual rashes, no skin lesions, no palpable subcutaneous nodules and good skin turgor    Impression:      Diagnosis Orders   1. DDD (degenerative disc disease), lumbar      2. Lumbosacral spondylosis without myelopathy      3. Sacroiliac dysfunction      4. Spinal stenosis of lumbar region without neurogenic claudication      5. Primary osteoarthritis of right hip      6. Trochanteric bursitis, unspecified laterality            [de-identified] y.o.  F with h/o chronic low back and Lower extremity pain .    Patient seen today for right knee and lower back pain.     Failed conservative treatment.  Has done PT in Jan / Feb 2019. Continues HEP.     MRI changes noted - significant changes at L1-2.     Is on Plavix for h/o TIA.     Failed gabapentin trial but states that she would like to try again. States that it was over 20 years ago when she was working. Start with gabapentin 100 mg and titrate up to TID.       Does not want narcotics, do not recommend muscle relaxers, cannot use NSAIDs, and tylenol ineffective. .     Patient is s/p: b/l SIJ injection under fluoroscopy +  under fluoroscopic on 10/12/2020 with 80-90% relief x 10 months. Repeat with slight relief.   Consider repeat but would like to try gabapentin first.     C/o right knee pain - CSI ordered today.     Can continue Plavix for SIJ injection.      Right GT hip injection was not helpful.        If her radicular pain persists, will consider LESI L1/2 or L2-3 (paramedian approach)- but for this she needs to be off Plavix for 7 days prior.      Continue HEP.     Has used Compound cream for local use which caused her to [facility] steve. \"  No longer using.          Controlled Substance Monitoring:    Acute and Chronic Pain Monitoring:   RX Monitoring 9/15/2021   Periodic Controlled Substance Monitoring No signs of potential drug abuse or diversion identified. ;Assessed functional status. We discussed with the patient that combining opioids, benzodiazepines, alcohol, illicit drugs or sleep aids increases the risk of respiratory depression including death. We discussed that these medications may cause drowsiness, sedation or dizziness and have counseled the patient not to drive or operate machinery. We have discussed that these medications will be prescribed only by one provider. We have discussed with the patient about age related risk factors and have thoroughly discussed the importance of taking these medications as prescribed. The patient verbalizes understanding.     ccreferring physic

## 2021-09-15 ENCOUNTER — OFFICE VISIT (OUTPATIENT)
Dept: PAIN MANAGEMENT | Age: 81
End: 2021-09-15
Payer: MEDICARE

## 2021-09-15 VITALS
BODY MASS INDEX: 22.15 KG/M2 | OXYGEN SATURATION: 98 % | WEIGHT: 125 LBS | HEIGHT: 63 IN | RESPIRATION RATE: 16 BRPM | SYSTOLIC BLOOD PRESSURE: 122 MMHG | DIASTOLIC BLOOD PRESSURE: 72 MMHG | TEMPERATURE: 94.3 F | HEART RATE: 74 BPM

## 2021-09-15 DIAGNOSIS — M47.817 LUMBOSACRAL SPONDYLOSIS WITHOUT MYELOPATHY: ICD-10-CM

## 2021-09-15 DIAGNOSIS — M54.16 LUMBAR RADICULOPATHY: Primary | ICD-10-CM

## 2021-09-15 DIAGNOSIS — M48.061 SPINAL STENOSIS OF LUMBAR REGION, UNSPECIFIED WHETHER NEUROGENIC CLAUDICATION PRESENT: ICD-10-CM

## 2021-09-15 DIAGNOSIS — M51.36 DDD (DEGENERATIVE DISC DISEASE), LUMBAR: ICD-10-CM

## 2021-09-15 DIAGNOSIS — M53.3 SACROILIAC DYSFUNCTION: ICD-10-CM

## 2021-09-15 PROCEDURE — G8427 DOCREV CUR MEDS BY ELIG CLIN: HCPCS | Performed by: PHYSICIAN ASSISTANT

## 2021-09-15 PROCEDURE — G8399 PT W/DXA RESULTS DOCUMENT: HCPCS | Performed by: PHYSICIAN ASSISTANT

## 2021-09-15 PROCEDURE — 1123F ACP DISCUSS/DSCN MKR DOCD: CPT | Performed by: PHYSICIAN ASSISTANT

## 2021-09-15 PROCEDURE — 4040F PNEUMOC VAC/ADMIN/RCVD: CPT | Performed by: PHYSICIAN ASSISTANT

## 2021-09-15 PROCEDURE — G8420 CALC BMI NORM PARAMETERS: HCPCS | Performed by: PHYSICIAN ASSISTANT

## 2021-09-15 PROCEDURE — 1090F PRES/ABSN URINE INCON ASSESS: CPT | Performed by: PHYSICIAN ASSISTANT

## 2021-09-15 PROCEDURE — 99213 OFFICE O/P EST LOW 20 MIN: CPT | Performed by: PHYSICIAN ASSISTANT

## 2021-09-15 PROCEDURE — 1036F TOBACCO NON-USER: CPT | Performed by: PHYSICIAN ASSISTANT

## 2021-09-15 PROCEDURE — 99214 OFFICE O/P EST MOD 30 MIN: CPT | Performed by: PHYSICIAN ASSISTANT

## 2021-09-15 RX ORDER — GABAPENTIN 100 MG/1
CAPSULE ORAL
Qty: 63 CAPSULE | Refills: 0 | Status: SHIPPED
Start: 2021-09-15 | End: 2021-09-23 | Stop reason: SINTOL

## 2021-09-15 NOTE — PROGRESS NOTES
Do you currently have any of the following:    Fever: No  Headache:  No  Cough: No  Shortness of breath: No  Exposed to anyone with these symptoms: No         Gregory Wheeler presents to the [facility] on 9/15/2021. Emma Rodriguez is complaining of pain low back. The pain is constant. The pain is described as aching. Pain is rated on her best day at a 5, on her worst day at a 8, and on average at a 6 on the VAS scale. She took her last dose of      Any procedures since your last visit:     Pacemaker or defibrillator: No managed by     She is not on NSAIDS and is  on anticoagulation medications to include Plavix and is managed by PCP    Medication Contract and Consent for Opioid Use Documents Filed      No documents found                /72   Pulse 74   Temp 94.3 °F (34.6 °C) (Infrared)   Resp 16   Ht 5' 3\" (1.6 m)   Wt 125 lb (56.7 kg)   SpO2 98%   BMI 22.14 kg/m²      No LMP recorded.  Patient is postmenopausal.

## 2021-09-23 ENCOUNTER — PREP FOR PROCEDURE (OUTPATIENT)
Dept: PAIN MANAGEMENT | Age: 81
End: 2021-09-23

## 2021-09-23 ENCOUNTER — PROCEDURE VISIT (OUTPATIENT)
Dept: PAIN MANAGEMENT | Age: 81
End: 2021-09-23
Payer: MEDICARE

## 2021-09-23 DIAGNOSIS — M54.16 LUMBAR RADICULOPATHY: ICD-10-CM

## 2021-09-23 DIAGNOSIS — M17.11 PRIMARY OSTEOARTHRITIS OF RIGHT KNEE: ICD-10-CM

## 2021-09-23 DIAGNOSIS — M51.36 DDD (DEGENERATIVE DISC DISEASE), LUMBAR: Primary | ICD-10-CM

## 2021-09-23 DIAGNOSIS — M54.16 LUMBAR RADICULOPATHY: Primary | ICD-10-CM

## 2021-09-23 DIAGNOSIS — M51.36 DDD (DEGENERATIVE DISC DISEASE), LUMBAR: ICD-10-CM

## 2021-09-23 DIAGNOSIS — M47.817 LUMBOSACRAL SPONDYLOSIS WITHOUT MYELOPATHY: ICD-10-CM

## 2021-09-23 DIAGNOSIS — M53.3 SACROILIAC DYSFUNCTION: ICD-10-CM

## 2021-09-23 PROCEDURE — 1036F TOBACCO NON-USER: CPT | Performed by: ANESTHESIOLOGY

## 2021-09-23 PROCEDURE — 4040F PNEUMOC VAC/ADMIN/RCVD: CPT | Performed by: ANESTHESIOLOGY

## 2021-09-23 PROCEDURE — 99213 OFFICE O/P EST LOW 20 MIN: CPT | Performed by: ANESTHESIOLOGY

## 2021-09-23 PROCEDURE — G8420 CALC BMI NORM PARAMETERS: HCPCS | Performed by: ANESTHESIOLOGY

## 2021-09-23 PROCEDURE — 20610 DRAIN/INJ JOINT/BURSA W/O US: CPT | Performed by: ANESTHESIOLOGY

## 2021-09-23 PROCEDURE — 1090F PRES/ABSN URINE INCON ASSESS: CPT | Performed by: ANESTHESIOLOGY

## 2021-09-23 PROCEDURE — 1123F ACP DISCUSS/DSCN MKR DOCD: CPT | Performed by: ANESTHESIOLOGY

## 2021-09-23 PROCEDURE — G8399 PT W/DXA RESULTS DOCUMENT: HCPCS | Performed by: ANESTHESIOLOGY

## 2021-09-23 PROCEDURE — G8428 CUR MEDS NOT DOCUMENT: HCPCS | Performed by: ANESTHESIOLOGY

## 2021-09-23 RX ORDER — METHYLPREDNISOLONE ACETATE 40 MG/ML
40 INJECTION, SUSPENSION INTRA-ARTICULAR; INTRALESIONAL; INTRAMUSCULAR; SOFT TISSUE ONCE
Status: COMPLETED | OUTPATIENT
Start: 2021-09-23 | End: 2021-09-23

## 2021-09-23 RX ORDER — TRAMADOL HYDROCHLORIDE 50 MG/1
50 TABLET ORAL 2 TIMES DAILY PRN
Qty: 20 TABLET | Refills: 0 | Status: SHIPPED | OUTPATIENT
Start: 2021-09-23 | End: 2021-10-03

## 2021-09-23 RX ORDER — BUPIVACAINE HYDROCHLORIDE 2.5 MG/ML
4 INJECTION, SOLUTION EPIDURAL; INFILTRATION; INTRACAUDAL ONCE
Status: COMPLETED | OUTPATIENT
Start: 2021-09-23 | End: 2021-09-23

## 2021-09-23 RX ADMIN — BUPIVACAINE HYDROCHLORIDE 4 ML: 2.5 INJECTION, SOLUTION EPIDURAL; INFILTRATION; INTRACAUDAL at 15:36

## 2021-09-23 RX ADMIN — METHYLPREDNISOLONE ACETATE 40 MG: 40 INJECTION, SUSPENSION INTRA-ARTICULAR; INTRALESIONAL; INTRAMUSCULAR; SOFT TISSUE at 15:37

## 2021-09-23 NOTE — PROGRESS NOTES
EYE SURGERY Bilateral     cataract removal w lense implants    HC INJECT OTHER PERPHRL NERV Bilateral 9/12/2019    BILATERAL SACROILIAC JOINT INJECTION performed by Bridger Saunders MD at [address] Bilateral 10/12/2020    BILATERAL SACROILIAC JOINT INJECTION UNDER FLUOROSCOPY performed by Bridger Saunders MD at [address] Bilateral 8/23/2021    BILATERAL [address]  performed by Bridger Saunders MD at [address] [facility]  09/26/2017       Prior to Admission medications    Medication Sig Start Date End Date Taking? Authorizing Provider   traMADol (ULTRAM) 50 MG tablet Take 1 tablet by mouth 2 times daily as needed for Pain for up to 10 days. Take lowest dose possible to manage pain 9/23/21 10/3/21 Yes Bridger Saunders MD   denosumab (PROLIA) 60 MG/ML SOSY SC injection Inject 60 mg into the skin once 06/2021    Historical Provider, MD   Multiple Vitamins-Minerals (PRESERVISION AREDS 2 PO) Take by mouth    Historical Provider, MD   simvastatin (ZOCOR) 20 MG tablet 20 mg nightly  8/1/16   Historical Provider, MD   losartan (COZAAR) 50 MG tablet Take 50 mg by mouth daily  8/21/16   Historical Provider, MD   Biotin 1000 MCG TABS Take 1 tablet by mouth daily     Historical Provider, MD   Coenzyme Q10 (CO Q 10) 100 MG CAPS Take 200 mg by mouth daily    Historical Provider, MD   Cholecalciferol (VITAMIN D3) 3000 UNITS TABS Take 5,000 Units by mouth daily     Historical Provider, MD   acetaminophen (TYLENOL) 500 MG tablet Take 500 mg by mouth every 6 hours as needed for Pain    Historical Provider, MD   clopidogrel (PLAVIX) 75 MG tablet Take 75 mg by mouth daily.       Historical Provider, MD       No Known Allergies    Social History     Socioeconomic History    Marital status:      Spouse name: Not on file    Number of children: Not on file    Years of education: Not on file    Highest normal     Lumbar spine:     CVA tenderness:No   Palpation:tenderness paravertebral muscles, midline tenderness, facet loading, right, positive and tenderness. Range of motion:abnormal moderately Lateral bending, flexion, extension rotation bilateral and is painful.          Extremities:     Tremors:None bilaterally upper and lower  No edema     Neurological:     Sensory:normal to light touch   Motor:   [address]     Dermatology:     Skin:no unusual rashes, no skin lesions, no palpable subcutaneous nodules and good skin turgor      Assessment/Plan:   Diagnosis Orders   1. DDD (degenerative disc disease), lumbar  traMADol (ULTRAM) 50 MG tablet   2. Lumbar radiculopathy     3. Lumbosacral spondylosis without myelopathy     4. Sacroiliac dysfunction     5. Primary osteoarthritis of right knee         H/o low back and right LE pain. Failed conservative treatment. MRI findings reviewed. Pain mainly over the right thigh upto the knee. Will schedule for right L2/ L3 TFESI under fluoroscopy. RBA discussed. Need to hold Plavix. Gets drowsy with Neurontin- will D/C Neurontin    Short course of tramadol. # 20 tabs- if helps. Will plan long term low dose tramadol in future. Will do right knee injection today. Counseling : reg regular HEP, planned treatment done. Ernie Webb MD      2021    Patient: Becki Coughlin  :  1940  Age:  80 y.o. Sex:  female     PRE-OPERATIVE DIAGNOSIS: Right  Knee pain, osteoarthitis. POST-OPERATIVE DIAGNOSIS: Same. PROCEDURE PERFORMED:  Right knee injection. SURGEON:   Ernie Webb MD    ANESTHESIA: local    ESTIMATED BLOOD LOSS: None. BRIEF HISTORY: After discussing the potential risks and benefits of the procedure with the patient. Bryan did request that we proceed. A complete History & Physical was reviewed and it is unchanged. DESCRIPTION OF PROCEDURE:   The patient was placed in a seated position.  The area of the Right knee was prepped with chloraprep and draped in a sterile manner. Then the targeted area of the patellar tendon was palpated and marked. A 25 gauge 1-1/2 inch needle was advanced into the joint capsule. Confirmation of needle placement was obtained by direct visualization of synovial fluid by aspiration. A solution of 0.25 % marcaine 4 cc and 40 mg DepoMedrol was injected easily without complications. The needle was then removed and Band-Aid applied. Disposition the patient tolerated the procedure well and there were no complications . Nathalie Basurto will follow up in our [facility] as scheduled. She was encouraged to call with questions, concerns or if worsening of symptoms occurs.     Gailen Cockayne, MD

## 2021-09-28 ENCOUNTER — TELEPHONE (OUTPATIENT)
Dept: PAIN MANAGEMENT | Age: 81
End: 2021-09-28

## 2021-09-28 NOTE — TELEPHONE ENCOUNTER
Call to Francoise Rubio that and message left that procedure was approved for 10/11/2021 and that the surgery center should call her a few days before for the pre op call and after 3:00 PM the business day before with the arrival time. Instructed Matilde to hold ibuprofen for 24 hours, naprosyn for 4 days and any aspirin containing products, Plavix, or fish oil for 7 days. Instructed to call office back if any questions.

## 2021-10-01 NOTE — TELEPHONE ENCOUNTER
Willaalfonso Emelyn called back and states that she is not feeling well and does not want to hold her Plavix for 7 days, so she is cancelling her procedure and dalila reschedule another time. She also wanted it noted that the last knee injection did not help. She is keeping her follow up appointment in November.

## 2021-11-09 ENCOUNTER — HOSPITAL ENCOUNTER (OUTPATIENT)
Dept: GENERAL RADIOLOGY | Age: 81
Discharge: HOME | End: 2021-11-11
Payer: MEDICARE

## 2021-11-09 ENCOUNTER — HOSPITAL ENCOUNTER (OUTPATIENT)
Age: 81
Discharge: HOME | End: 2021-11-11
Payer: MEDICARE

## 2021-11-09 DIAGNOSIS — M25.561 RIGHT KNEE PAIN, UNSPECIFIED CHRONICITY: ICD-10-CM

## 2021-11-09 PROCEDURE — 73564 X-RAY EXAM KNEE 4 OR MORE: CPT

## 2021-12-14 ENCOUNTER — HOSPITAL ENCOUNTER (OUTPATIENT)
Dept: INFUSION THERAPY | Age: 81
Setting detail: INFUSION SERIES
Discharge: HOME | End: 2021-12-14
Payer: MEDICARE

## 2021-12-14 VITALS
OXYGEN SATURATION: 100 % | TEMPERATURE: 98.1 F | BODY MASS INDEX: 22.15 KG/M2 | HEIGHT: 63 IN | HEART RATE: 102 BPM | DIASTOLIC BLOOD PRESSURE: 80 MMHG | SYSTOLIC BLOOD PRESSURE: 146 MMHG | WEIGHT: 125 LBS

## 2021-12-14 DIAGNOSIS — M81.0 SENILE OSTEOPOROSIS: Primary | ICD-10-CM

## 2021-12-14 PROCEDURE — 6360000002 HC RX W HCPCS: Performed by: FAMILY MEDICINE

## 2021-12-14 PROCEDURE — 96372 THER/PROPH/DIAG INJ SC/IM: CPT

## 2021-12-14 RX ORDER — SODIUM CHLORIDE 9 MG/ML
INJECTION, SOLUTION INTRAVENOUS CONTINUOUS
Status: CANCELLED | OUTPATIENT
Start: 2021-12-14

## 2021-12-14 RX ORDER — EPINEPHRINE 1 MG/ML
0.3 INJECTION, SOLUTION, CONCENTRATE INTRAVENOUS PRN
Status: CANCELLED | OUTPATIENT
Start: 2021-12-14

## 2021-12-14 RX ORDER — HYDROCHLOROTHIAZIDE 12.5 MG/1
12.5 CAPSULE, GELATIN COATED ORAL DAILY
COMMUNITY

## 2021-12-14 RX ORDER — METHYLPREDNISOLONE SODIUM SUCCINATE 125 MG/2ML
125 INJECTION, POWDER, LYOPHILIZED, FOR SOLUTION INTRAMUSCULAR; INTRAVENOUS ONCE
Status: CANCELLED | OUTPATIENT
Start: 2021-12-14 | End: 2021-12-14

## 2021-12-14 RX ORDER — DIPHENHYDRAMINE HYDROCHLORIDE 50 MG/ML
50 INJECTION INTRAMUSCULAR; INTRAVENOUS ONCE
Status: CANCELLED | OUTPATIENT
Start: 2021-12-14 | End: 2021-12-14

## 2021-12-14 RX ADMIN — DENOSUMAB 60 MG: 60 INJECTION SUBCUTANEOUS at 10:58

## 2021-12-14 ASSESSMENT — PAIN DESCRIPTION - PAIN TYPE: TYPE: CHRONIC PAIN

## 2021-12-14 ASSESSMENT — PAIN DESCRIPTION - LOCATION: LOCATION: GENERALIZED

## 2021-12-14 ASSESSMENT — PAIN SCALES - GENERAL: PAINLEVEL_OUTOF10: 7

## 2022-03-16 ENCOUNTER — HOSPITAL ENCOUNTER (OUTPATIENT)
Age: 82
Discharge: HOME | End: 2022-03-18
Payer: MEDICARE

## 2022-03-16 ENCOUNTER — HOSPITAL ENCOUNTER (OUTPATIENT)
Dept: MAMMOGRAPHY | Age: 82
Discharge: HOME | End: 2022-03-18
Payer: MEDICARE

## 2022-03-16 DIAGNOSIS — Z12.31 ENCOUNTER FOR SCREENING MAMMOGRAM FOR MALIGNANT NEOPLASM OF BREAST: ICD-10-CM

## 2022-03-16 PROCEDURE — 77063 BREAST TOMOSYNTHESIS BI: CPT

## 2022-03-28 ENCOUNTER — TELEPHONE (OUTPATIENT)
Dept: CARDIOLOGY | Age: 82
End: 2022-03-28

## 2022-03-28 NOTE — TELEPHONE ENCOUNTER
PATIENT CALLED TO SCHEDULE ECHO FOR 05-03-22. REVIEWED COVID CHECKLIST WITH PATIENT.     Electronically signed by Mariano Bright on 3/28/2022 at 8:51 AM

## 2022-04-19 ENCOUNTER — HOSPITAL ENCOUNTER (OUTPATIENT)
Dept: CARDIOLOGY | Age: 82
Discharge: HOME | End: 2022-04-19
Payer: MEDICARE

## 2022-04-19 LAB
LV EF: 55 %
LVEF MODALITY: NORMAL

## 2022-04-19 PROCEDURE — 93306 TTE W/DOPPLER COMPLETE: CPT

## 2022-05-09 ENCOUNTER — TELEPHONE (OUTPATIENT)
Dept: ADMINISTRATIVE | Age: 82
End: 2022-05-09

## 2022-05-09 NOTE — TELEPHONE ENCOUNTER
Pt called to schedule an appt for cardiac clearance prior to knee surgery scheduled on 6/13/22. Dr. Lonny Harkins first available appt is 6/20/22.   Please call pt and advise

## 2022-05-10 NOTE — TELEPHONE ENCOUNTER
patient will call back on Monday to talk to [facility]

## 2022-05-25 ENCOUNTER — OFFICE VISIT (OUTPATIENT)
Dept: CARDIOLOGY CLINIC | Age: 82
End: 2022-05-25
Payer: MEDICARE

## 2022-05-25 VITALS
HEIGHT: 63 IN | HEART RATE: 87 BPM | OXYGEN SATURATION: 98 % | WEIGHT: 130.1 LBS | RESPIRATION RATE: 18 BRPM | SYSTOLIC BLOOD PRESSURE: 118 MMHG | DIASTOLIC BLOOD PRESSURE: 74 MMHG | BODY MASS INDEX: 23.05 KG/M2

## 2022-05-25 DIAGNOSIS — I35.1 NONRHEUMATIC AORTIC VALVE INSUFFICIENCY: Primary | ICD-10-CM

## 2022-05-25 DIAGNOSIS — R06.09 DOE (DYSPNEA ON EXERTION): ICD-10-CM

## 2022-05-25 DIAGNOSIS — R07.89 OTHER CHEST PAIN: ICD-10-CM

## 2022-05-25 PROCEDURE — 99215 OFFICE O/P EST HI 40 MIN: CPT | Performed by: INTERNAL MEDICINE

## 2022-05-25 PROCEDURE — G8399 PT W/DXA RESULTS DOCUMENT: HCPCS | Performed by: INTERNAL MEDICINE

## 2022-05-25 PROCEDURE — 93000 ELECTROCARDIOGRAM COMPLETE: CPT | Performed by: INTERNAL MEDICINE

## 2022-05-25 PROCEDURE — 1090F PRES/ABSN URINE INCON ASSESS: CPT | Performed by: INTERNAL MEDICINE

## 2022-05-25 PROCEDURE — G8420 CALC BMI NORM PARAMETERS: HCPCS | Performed by: INTERNAL MEDICINE

## 2022-05-25 PROCEDURE — 1123F ACP DISCUSS/DSCN MKR DOCD: CPT | Performed by: INTERNAL MEDICINE

## 2022-05-25 PROCEDURE — G8427 DOCREV CUR MEDS BY ELIG CLIN: HCPCS | Performed by: INTERNAL MEDICINE

## 2022-05-25 PROCEDURE — 1036F TOBACCO NON-USER: CPT | Performed by: INTERNAL MEDICINE

## 2022-05-25 RX ORDER — LOSARTAN POTASSIUM 25 MG/1
12.5 TABLET ORAL DAILY
Qty: 45 TABLET | Refills: 3 | Status: SHIPPED | OUTPATIENT
Start: 2022-05-25

## 2022-05-25 RX ORDER — ASPIRIN 81 MG/1
81 TABLET ORAL DAILY
COMMUNITY

## 2022-05-25 NOTE — PROGRESS NOTES
Kylah Fajardo  1940  Date of Service: 5/25/2022    Patient Active Problem List    Diagnosis Date Noted    Senile osteoporosis 06/02/2021     Overview Note:     Diagnosis added to problem list by Phi Martinez based on transcribed order from Dr Zach García.  Sacroiliac dysfunction 08/19/2019    Platelet inhibition due to Plavix 08/19/2019    Chronic pain syndrome 07/23/2019    Lumbar radiculopathy 07/23/2019    Chronic right-sided low back pain with right-sided sciatica 07/08/2019    Lumbosacral spondylosis without myelopathy 07/08/2019    Primary osteoarthritis of right hip 07/08/2019    Spinal stenosis of lumbar region without neurogenic claudication 07/08/2019    DDD (degenerative disc disease), lumbar 07/08/2019    Nonrheumatic aortic valve insufficiency 03/22/2019     Overview Note:     Trace      Primary osteoarthritis of right knee 10/11/2018    Primary osteoarthritis of right shoulder 01/11/2017       Social History     Socioeconomic History    Marital status:      Spouse name: None    Number of children: None    Years of education: None    Highest education level: None   Occupational History    Occupation: retired   Tobacco Use    Smoking status: Never Smoker    Smokeless tobacco: Never Used   Vaping Use    Vaping Use: Never used   Substance and Sexual Activity    Alcohol use: No     Alcohol/week: 0.0 standard drinks    Drug use: No    Sexual activity: None   Other Topics Concern    None   Social History Narrative    None     Social Determinants of Health     Financial Resource Strain:     Difficulty of Paying Living Expenses: Not on file   Food Insecurity:     Worried About Running Out of Food in the Last Year: Not on file    Mat of Food in the Last Year: Not on file   Transportation Needs:     Lack of Transportation (Medical): Not on file    Lack of Transportation (Non-Medical):  Not on file   Physical Activity:     Days of Exercise per Week: Not on file    Minutes of Exercise per Session: Not on file   Stress:     Feeling of Stress : Not on file   Social Connections:     Frequency of Communication with Friends and Family: Not on file    Frequency of Social Gatherings with Friends and Family: Not on file    Attends [employer] Services: Not on file    Active Member of [address] [place] [address] or Organizations: Not on file    Attends Club or Organization Meetings: Not on file    Marital Status: Not on file   Intimate Partner Violence:     Fear of Current or Ex-Partner: Not on file    Emotionally Abused: Not on file    Physically Abused: Not on file    Sexually Abused: Not on file   Housing Stability:     Unable to Pay for Housing in the Last Year: Not on file    Number of [place] in the Last Year: Not on file    Unstable Housing in the Last Year: Not on file       Current Outpatient Medications   Medication Sig Dispense Refill    aspirin 81 MG EC tablet Take 81 mg by mouth daily      hydroCHLOROthiazide (MICROZIDE) 12.5 MG capsule Take 12.5 mg by mouth daily      denosumab (PROLIA) 60 MG/ML SOSY SC injection Inject 60 mg into the skin every 6 months 06/2021       Multiple Vitamins-Minerals (PRESERVISION AREDS 2 PO) Take by mouth      simvastatin (ZOCOR) 20 MG tablet 20 mg nightly       losartan (COZAAR) 25 MG tablet Take 25 mg by mouth daily       Biotin 1000 MCG TABS Take 1 tablet by mouth daily       Coenzyme Q10 (CO Q 10) 100 MG CAPS Take 200 mg by mouth daily      Cholecalciferol (VITAMIN D3) 3000 UNITS TABS Take 5,000 Units by mouth daily       acetaminophen (TYLENOL) 500 MG tablet Take 1,000 mg by mouth daily       clopidogrel (PLAVIX) 75 MG tablet Take 75 mg by mouth daily. (Patient not taking: Reported on 5/25/2022)       No current facility-administered medications for this visit.         No Known Allergies    Chief Complaint:  Gregory Wheeler is here today for follow up and management/recomendations for aortic regurgitation    History of Present Illness: Jaylon Brito is somewhat of a difficult historian. However, states that She does household chores, goes up the stairs (at her son's house), & goes shopping. She states that for the past month she has had intermittent episodes of chest discomfort. She states that they occur multiple times per day and last for couple of hours at a time. They are not associate with physical activities. She also complains of occasional episodes of lightheadedness with walking for the past few months. She has chronic lower extremity edema and then states that she has had dyspnea with exertion for the past month. She states that she has chronic lower extremity edema that is unchanged. REVIEW OF SYSTEMS:  As above. Patient does not complain of any fever, chills, nausea, vomiting or diarrhea. No focal, motor or neurological deficits. No changes in his/her vision, hearing, bowel or bladder habits. She is not known to have a history of thyroid problems. No recent nose bleeds. PHYSICAL EXAM:  Vitals:    05/25/22 0909   BP: 118/74   Pulse: 87   Resp: 18   SpO2: 98%   Weight: 130 lb 1.6 oz (59 kg)   Height: 5' 3\" (1.6 m)       GENERAL:  She is alert and oriented x 3, communicates well, in no distress. NECK:  No masses, trachea is mid position. Supple, full ROM, no JVD on exam or bruits. No palpable thyromegaly or lymphadenopathy. HEART:  Regular rate and rhythm. Normal S1 and S2. There is an S4 gallop. There is a 1/6 systolic murmur. LUNGS:  Clear to auscultation bilaterally. No use of accessory muscles. symmetrical excursion. ABDOMEN:  Soft, non-tender. Normal bowel sounds. EXTREMITIES:  Full ROM x 4. Thick but mild to moderate bilateral lower extremity edema. Good distal pulses. EYES:  Extraocular muscles intact. PERRL. Normal lids & conjunctiva. ENT:  Nares are clear & not bleeding. Moist mucosa. Normal lips formation.   No external masses   NEURO: no tremors, full ROM x 4, EOMI. SKIN:  Warm, dry and intact. Normal turgor. Chest: Tender to palpation. She states that this exactly reproduces her symptoms. EKG: Sinus rhythm, 87 bpm, nl axis, nonspecific ST - T wave changes. Assessment:   1. Trace aortic regurgitation. Echo 4/19/2021 shows this is still mild. 2. Prior symptoms of orthostatic hypotension. 3. Baseline double vision and vertigo. 4. Noncardiac chest pain reproduced with palpation as described above. More consistent with musculoskeletal.  5. Chronic lower extremity edema. 6. GERD  7. TIA  8. Hypertension, well controled at this time. Recommendations:  1. Continue to follow the cholesterol with Dr. Maria Isabel Grace. 2. Decrease the losartan to 12.5 mg daily. 3. I reviewed her echo results with her. She has normal systolic function, mild aortic regurgitation, and an RVSP of 22 mmHg. Therefore, her dyspnea is not consistent with CHF. 4. Lexiscan Cardiolite stress test.  5. She is also asking for preop cardiac risk stratification for knee surgery. That would be based on the stress test results. Thank you for allowing me to participate in your patient's care.       [address], [address] [facility]  Interventional Cardiology

## 2022-05-27 ENCOUNTER — HOSPITAL ENCOUNTER (OUTPATIENT)
Dept: CARDIOLOGY | Age: 82
Discharge: HOME | End: 2022-05-27
Payer: MEDICARE

## 2022-05-27 VITALS
RESPIRATION RATE: 18 BRPM | WEIGHT: 128 LBS | HEART RATE: 79 BPM | HEIGHT: 63 IN | BODY MASS INDEX: 22.68 KG/M2 | DIASTOLIC BLOOD PRESSURE: 70 MMHG | SYSTOLIC BLOOD PRESSURE: 122 MMHG

## 2022-05-27 DIAGNOSIS — I35.1 NONRHEUMATIC AORTIC VALVE INSUFFICIENCY: ICD-10-CM

## 2022-05-27 DIAGNOSIS — R06.09 DOE (DYSPNEA ON EXERTION): ICD-10-CM

## 2022-05-27 DIAGNOSIS — R07.89 OTHER CHEST PAIN: ICD-10-CM

## 2022-05-27 PROCEDURE — A9500 TC99M SESTAMIBI: HCPCS | Performed by: INTERNAL MEDICINE

## 2022-05-27 PROCEDURE — 78452 HT MUSCLE IMAGE SPECT MULT: CPT

## 2022-05-27 PROCEDURE — 2580000003 HC RX 258: Performed by: INTERNAL MEDICINE

## 2022-05-27 PROCEDURE — 3430000000 HC RX DIAGNOSTIC RADIOPHARMACEUTICAL: Performed by: INTERNAL MEDICINE

## 2022-05-27 PROCEDURE — 93017 CV STRESS TEST TRACING ONLY: CPT

## 2022-05-27 PROCEDURE — 6360000002 HC RX W HCPCS: Performed by: INTERNAL MEDICINE

## 2022-05-27 RX ORDER — SODIUM CHLORIDE 0.9 % (FLUSH) 0.9 %
10 SYRINGE (ML) INJECTION PRN
Status: DISCONTINUED | OUTPATIENT
Start: 2022-05-27 | End: 2022-05-28 | Stop reason: HOSPADM

## 2022-05-27 RX ADMIN — SODIUM CHLORIDE, PRESERVATIVE FREE 10 ML: 5 INJECTION INTRAVENOUS at 09:12

## 2022-05-27 RX ADMIN — SODIUM CHLORIDE, PRESERVATIVE FREE 10 ML: 5 INJECTION INTRAVENOUS at 10:53

## 2022-05-27 RX ADMIN — Medication 9.8 MILLICURIE: at 09:12

## 2022-05-27 RX ADMIN — REGADENOSON 0.4 MG: 0.08 INJECTION, SOLUTION INTRAVENOUS at 10:52

## 2022-05-27 RX ADMIN — SODIUM CHLORIDE, PRESERVATIVE FREE 10 ML: 5 INJECTION INTRAVENOUS at 10:52

## 2022-05-27 RX ADMIN — Medication 32.7 MILLICURIE: at 10:52

## 2022-05-27 NOTE — PROCEDURES
[address] [facility] V[facility] [address] [address] [address]  [phone]                Pharmacologic Stress Nuclear Gated SPECT Study    Name: Naun Account Number: [de-identified]    :  1940          Sex: female         Date of Study:  2022    Height: 5' 3\" (160 cm)         Weight: 128 lb (58.1 kg)     Ordering Provider: Rebekah Valderrama          PCP: Jessenia Aragon MD      Cardiologist: Rebekah Valderrama             Interpreting Physician: Rebekah Valderrama  _________________________________________________________________________________    Indication:   Detecting the presence and location of coronary artery disease    Clinical History:   Patient has no known history of coronary artery disease. Resting ECG:    Sinus rhythm, 74 bpm.  Normal axis. Nonspecific ST-T waves. Procedure:   Pharmacologic stress testing was performed with regadenoson 0.4 mg for 15 seconds  The heart rate was 79 at baseline and roxana to 120 beats during the infusion. This corresponds to 86% of maximum predicted heart rate. The blood pressure at baseline was 122/70 and blood pressure at the end of infusion was 118/60. Blood pressure response was normal during the stress procedure. The patient experienced warm flushed feeling post lexiscan resolved in recovery during the infusion. ECG during the infusion did not change. IMAGING: Myocardial perfusion imaging was performed at rest 30-35 minutes following the intravenous injection of 9.8 mCi of (Tc-Sestamibi) followed by 10 ml of Normal Saline. As per infusion protocol, the patient was injected intravenously with 32.7 mCi of (Tc-Sestamibi) followed by 10 ml of Normal Saline. Gated post-stress tomographic imaging was performed 20-25 minutes after stress. FINDINGS: The overall quality of the study was good.      Left ventricular cavity size was noted to be normal.    Rotational analog analysis demonstrated soft tissue breast attenuation. The gated SPECT stress imaging in the short, vertical long, and horizontal long axis demonstrated normal homogeneous tracer distribution throughout the myocardium. The resting images show no change. Gated SPECT left ventricular ejection fraction was calculated to be 88%, with normal myocardial thickening and wall motion. Impression:    1. ECG during the infusion did not change. 2. The myocardial perfusion imaging was normal.      3. Overall left ventricular systolic function was normal without regional wall motion abnormalities. 4. Low risk general pharmacologic stress test.    Thank you for sending your patient to this [employer].      Electronically signed by Rubin Whaley DO on 5/27/22 at 2:56 PM EDT

## 2022-06-03 ENCOUNTER — HOSPITAL ENCOUNTER (OUTPATIENT)
Age: 82
Discharge: HOME | End: 2022-06-05

## 2022-06-03 LAB
ABO/RH: NORMAL
ANTIBODY SCREEN: NORMAL
APTT: 36.2 SEC (ref 24.5–35.1)
INR BLD: 1
PROTHROMBIN TIME: 11.1 SEC (ref 9.3–12.4)

## 2022-06-03 PROCEDURE — 85730 THROMBOPLASTIN TIME PARTIAL: CPT

## 2022-06-03 PROCEDURE — 87081 CULTURE SCREEN ONLY: CPT

## 2022-06-03 PROCEDURE — 86901 BLOOD TYPING SEROLOGIC RH(D): CPT

## 2022-06-03 PROCEDURE — 85610 PROTHROMBIN TIME: CPT

## 2022-06-03 PROCEDURE — 86900 BLOOD TYPING SEROLOGIC ABO: CPT

## 2022-06-03 PROCEDURE — 86850 RBC ANTIBODY SCREEN: CPT

## 2022-06-05 LAB — MRSA CULTURE ONLY: NORMAL

## 2022-06-08 ENCOUNTER — TELEPHONE (OUTPATIENT)
Dept: CARDIOLOGY CLINIC | Age: 82
End: 2022-06-08

## 2022-06-08 NOTE — TELEPHONE ENCOUNTER
Patient's revised cardiac risk index is low (0), class I risk, 0.4% risk of major perioperative cardiac events. Patient does have poor functional capacity secondary to knee problem, she had a Lexiscan nuclear stress test on 5/27/2022 which showed no evidence of ischemia or infarct with a normal LV function and normal wall motion. Patient does not have any active cardiac symptoms including chest pain, decompensated heart failure, uncontrolled arrhythmias or obstructive valve lesions. No further cardiac testing is needed and proceed with a surgery as scheduled. Please call us if you have any further questions or concerns.

## 2022-06-08 NOTE — TELEPHONE ENCOUNTER
Patient of Dr. Joann Viramontes. Needs cardiac clearance for right total knee arthroplasty on 6/13/22 by Dr. Smooth Paul at [facility]. Patient seen by Dr. Joann Viramontes 5/25/22 and had normal stress 5/27/22. Patient on Aspirin. Please review and advise.

## 2022-06-13 ENCOUNTER — HOSPITAL ENCOUNTER (OUTPATIENT)
Age: 82
Discharge: HOME | End: 2022-06-15

## 2022-06-13 PROCEDURE — 88305 TISSUE EXAM BY PATHOLOGIST: CPT

## 2022-06-13 PROCEDURE — 88311 DECALCIFY TISSUE: CPT

## 2022-06-14 ENCOUNTER — HOSPITAL ENCOUNTER (OUTPATIENT)
Dept: INFUSION THERAPY | Age: 82
Setting detail: INFUSION SERIES
End: 2022-06-14

## 2022-06-14 ENCOUNTER — HOSPITAL ENCOUNTER (OUTPATIENT)
Age: 82
Discharge: HOME | End: 2022-06-16

## 2022-06-14 LAB
ANION GAP SERPL CALCULATED.3IONS-SCNC: 13 MMOL/L (ref 7–16)
BUN BLDV-MCNC: 13 MG/DL (ref 6–23)
CALCIUM SERPL-MCNC: 8.3 MG/DL (ref 8.6–10.2)
CHLORIDE BLD-SCNC: 101 MMOL/L (ref 98–107)
CO2: 22 MMOL/L (ref 22–29)
CREAT SERPL-MCNC: 0.7 MG/DL (ref 0.5–1)
GFR AFRICAN AMERICAN: >60
GFR NON-AFRICAN AMERICAN: >60 ML/MIN/1.73
GLUCOSE BLD-MCNC: 142 MG/DL (ref 74–99)
HCT VFR BLD CALC: 36.1 % (ref 34–48)
HEMOGLOBIN: 12 G/DL (ref 11.5–15.5)
MCH RBC QN AUTO: 31.4 PG (ref 26–35)
MCHC RBC AUTO-ENTMCNC: 33.2 % (ref 32–34.5)
MCV RBC AUTO: 94.5 FL (ref 80–99.9)
PDW BLD-RTO: 12.3 FL (ref 11.5–15)
PLATELET # BLD: 212 E9/L (ref 130–450)
PMV BLD AUTO: 10.3 FL (ref 7–12)
POTASSIUM SERPL-SCNC: 3.3 MMOL/L (ref 3.5–5)
RBC # BLD: 3.82 E12/L (ref 3.5–5.5)
SODIUM BLD-SCNC: 136 MMOL/L (ref 132–146)
WBC # BLD: 12.9 E9/L (ref 4.5–11.5)

## 2022-06-14 PROCEDURE — 85027 COMPLETE CBC AUTOMATED: CPT

## 2022-06-14 PROCEDURE — 80048 BASIC METABOLIC PNL TOTAL CA: CPT

## 2022-06-15 ENCOUNTER — HOSPITAL ENCOUNTER (OUTPATIENT)
Age: 82
Discharge: HOME | End: 2022-06-17

## 2022-06-15 LAB
ANION GAP SERPL CALCULATED.3IONS-SCNC: 13 MMOL/L (ref 7–16)
BUN BLDV-MCNC: 14 MG/DL (ref 6–23)
CALCIUM SERPL-MCNC: 8.5 MG/DL (ref 8.6–10.2)
CHLORIDE BLD-SCNC: 97 MMOL/L (ref 98–107)
CO2: 22 MMOL/L (ref 22–29)
CREAT SERPL-MCNC: 0.7 MG/DL (ref 0.5–1)
GFR AFRICAN AMERICAN: >60
GFR NON-AFRICAN AMERICAN: >60 ML/MIN/1.73
GLUCOSE BLD-MCNC: 113 MG/DL (ref 74–99)
HCT VFR BLD CALC: 32.1 % (ref 34–48)
HEMOGLOBIN: 10.8 G/DL (ref 11.5–15.5)
MCH RBC QN AUTO: 31.2 PG (ref 26–35)
MCHC RBC AUTO-ENTMCNC: 33.6 % (ref 32–34.5)
MCV RBC AUTO: 92.8 FL (ref 80–99.9)
PDW BLD-RTO: 12.4 FL (ref 11.5–15)
PLATELET # BLD: 209 E9/L (ref 130–450)
PMV BLD AUTO: 10.2 FL (ref 7–12)
POTASSIUM SERPL-SCNC: 3.6 MMOL/L (ref 3.5–5)
RBC # BLD: 3.46 E12/L (ref 3.5–5.5)
SODIUM BLD-SCNC: 132 MMOL/L (ref 132–146)
WBC # BLD: 14.9 E9/L (ref 4.5–11.5)

## 2022-06-15 PROCEDURE — 80048 BASIC METABOLIC PNL TOTAL CA: CPT

## 2022-06-15 PROCEDURE — 85027 COMPLETE CBC AUTOMATED: CPT

## 2023-03-20 ENCOUNTER — HOSPITAL ENCOUNTER (OUTPATIENT)
Dept: MAMMOGRAPHY | Age: 83
Discharge: HOME | End: 2023-03-22
Payer: MEDICARE

## 2023-03-20 ENCOUNTER — HOSPITAL ENCOUNTER (OUTPATIENT)
Age: 83
Discharge: HOME | End: 2023-03-22
Payer: MEDICARE

## 2023-03-20 DIAGNOSIS — Z12.31 ENCOUNTER FOR SCREENING MAMMOGRAM FOR MALIGNANT NEOPLASM OF BREAST: ICD-10-CM

## 2023-03-20 PROCEDURE — 77063 BREAST TOMOSYNTHESIS BI: CPT

## 2023-07-26 ENCOUNTER — OFFICE VISIT (OUTPATIENT)
Dept: PAIN MANAGEMENT | Age: 83
End: 2023-07-26
Payer: MEDICARE

## 2023-07-26 ENCOUNTER — PREP FOR PROCEDURE (OUTPATIENT)
Dept: PAIN MANAGEMENT | Age: 83
End: 2023-07-26

## 2023-07-26 VITALS
OXYGEN SATURATION: 98 % | BODY MASS INDEX: 22.15 KG/M2 | HEIGHT: 63 IN | RESPIRATION RATE: 16 BRPM | WEIGHT: 125 LBS | HEART RATE: 80 BPM | DIASTOLIC BLOOD PRESSURE: 81 MMHG | TEMPERATURE: 97.9 F | SYSTOLIC BLOOD PRESSURE: 139 MMHG

## 2023-07-26 DIAGNOSIS — M54.16 LUMBAR RADICULOPATHY: Primary | ICD-10-CM

## 2023-07-26 DIAGNOSIS — Z96.659 PAIN IN KNEE REGION AFTER TOTAL KNEE REPLACEMENT, SEQUELA: ICD-10-CM

## 2023-07-26 DIAGNOSIS — M51.36 DDD (DEGENERATIVE DISC DISEASE), LUMBAR: Primary | ICD-10-CM

## 2023-07-26 DIAGNOSIS — M48.061 SPINAL STENOSIS OF LUMBAR REGION, UNSPECIFIED WHETHER NEUROGENIC CLAUDICATION PRESENT: ICD-10-CM

## 2023-07-26 DIAGNOSIS — M51.36 DDD (DEGENERATIVE DISC DISEASE), LUMBAR: ICD-10-CM

## 2023-07-26 DIAGNOSIS — Z96.651 S/P TKR (TOTAL KNEE REPLACEMENT), RIGHT: ICD-10-CM

## 2023-07-26 DIAGNOSIS — M47.817 LUMBOSACRAL SPONDYLOSIS WITHOUT MYELOPATHY: ICD-10-CM

## 2023-07-26 DIAGNOSIS — M79.2 NEURALGIA AND NEURITIS: ICD-10-CM

## 2023-07-26 DIAGNOSIS — M54.16 LUMBAR RADICULAR PAIN: ICD-10-CM

## 2023-07-26 DIAGNOSIS — T84.84XS PAIN IN KNEE REGION AFTER TOTAL KNEE REPLACEMENT, SEQUELA: ICD-10-CM

## 2023-07-26 DIAGNOSIS — M53.3 SACROILIAC DYSFUNCTION: ICD-10-CM

## 2023-07-26 PROCEDURE — 1090F PRES/ABSN URINE INCON ASSESS: CPT | Performed by: ANESTHESIOLOGY

## 2023-07-26 PROCEDURE — G8399 PT W/DXA RESULTS DOCUMENT: HCPCS | Performed by: ANESTHESIOLOGY

## 2023-07-26 PROCEDURE — 1036F TOBACCO NON-USER: CPT | Performed by: ANESTHESIOLOGY

## 2023-07-26 PROCEDURE — G8420 CALC BMI NORM PARAMETERS: HCPCS | Performed by: ANESTHESIOLOGY

## 2023-07-26 PROCEDURE — 99215 OFFICE O/P EST HI 40 MIN: CPT | Performed by: ANESTHESIOLOGY

## 2023-07-26 PROCEDURE — G8427 DOCREV CUR MEDS BY ELIG CLIN: HCPCS | Performed by: ANESTHESIOLOGY

## 2023-07-26 PROCEDURE — 1123F ACP DISCUSS/DSCN MKR DOCD: CPT | Performed by: ANESTHESIOLOGY

## 2023-07-26 PROCEDURE — 99213 OFFICE O/P EST LOW 20 MIN: CPT | Performed by: ANESTHESIOLOGY

## 2023-07-26 RX ORDER — SODIUM CHLORIDE 9 MG/ML
INJECTION, SOLUTION INTRAVENOUS PRN
Status: CANCELLED | OUTPATIENT
Start: 2023-07-26

## 2023-07-26 RX ORDER — SODIUM CHLORIDE 0.9 % (FLUSH) 0.9 %
5-40 SYRINGE (ML) INJECTION EVERY 12 HOURS SCHEDULED
Status: CANCELLED | OUTPATIENT
Start: 2023-07-26

## 2023-07-26 RX ORDER — SODIUM CHLORIDE 0.9 % (FLUSH) 0.9 %
5-40 SYRINGE (ML) INJECTION PRN
Status: CANCELLED | OUTPATIENT
Start: 2023-07-26

## 2023-07-26 NOTE — H&P (VIEW-ONLY)
[address] [facility]   [place], [address] [place] [address]  Dept: [phone]      Follow up Note      Michelle Marshall     Date of Visit:  7/26/2023    CC:    Chief Complaint   Patient presents with    Follow-up    Lower Back Pain    Knee Pain     Right knee       HPI:    Last seen in 2021. Here for reevaluation. Low back pain:  H/o Low back pain and right LE pain. S/P SIJ injection in 2021- good pain relief of SIJ pain. Right Knee pain:  H/o OA right knee- has undergone right knee replacement in June 2022 at [facility]. Continues to have right knee pain, intermittent swelling. Apparently has been evaluated by [facility] ortho- X ray apparently shows good hardware placement. She has noticed numbness over the right lateral knee since surgery. Nursing notes and details of the pain history reviewed. Please see intake notes for details. Prior treatment :  PT/ HEP    Medications    Interventions    Surgery : right knee replacement. No prior lumbar sine surgeries. Anticoagulants: ASA-81 mg     Imaging studies:    X-ray right knee : 7/6/2023:  Impression right knees/p total knee arthroplasty with no obvious findings of fracture or loosening. MRI of LS spine: 7/2019:  Impression       1. Degenerative spondylotic changes. 2. T12-L1: Midline to left-sided disc protrusion encroaching on the   left lateral recess. 3. L1-2: Right lateral to far lateral disc protrusion encroaching   moderately on the right lateral recess and also encroaching on the   right intervertebral foramen mildly crowding the exiting right L1   nerve root. 4. L2-3: Disc bulging with mild to moderate canalicular stenosis and   also encroachment on the right intervertebral foramen mildly crowding   the exiting right L2 nerve root. Potential Aberrant Drug-Related Behavior:  no    Urine Drug Screening:    OARRS report[de-identified] Reviewed.     Past Medical History:   Diagnosis Date    Arthritis     History of double vision OT ACUTE Treatment Session    Pt seen on 5 nursing unit.                                                          Frequency Comments: IF(would benefit, D/C pend) M-F     Admitting complaint:: Respiratory failure (CMS/HCC) [J96.90]                                                                                                ASSESSMENT:  Pt required supervision/min assist overall for self care performance and functional mobility tasks in room and on unit, pushing IV pole for support and short distances without IV pole (slow, shuffled steps). Pt would benefit from further skilled OT services to progress towards goals. Pt in agreement with plan of care.      RECOMMENDATIONS FOR DISCHARGE:  Recommendations for Discharge: OT WI: Home (02/29/20 1110)    OT Identified Barriers to Discharge: ADL performance, activity tolerance     PT/OT Mobility Equipment for Discharge: pt issued and fit for cane for home (02/29/20 0872)  PT/OT ADL Equipment for Discharge: cont to assess need for shower seat/grab bars (02/29/20 1110)    Assistance needed when returning home:   Discussed with patient/caregiver who acknowledged understanding of current recommendations for safe home discharge plan. Based on current level of assistance, recommendations are: home. Help to be provided by family/friends.    ICU Mobility Assesment (PERME):         PLAN: Continue skilled OT, including the following Treatment Interventions: ADL retraining;Functional transfer training (02/28/20 1125)   Treatment Plan for Next Session: Bathing, dressing in bathroom          EDUCATION:   On this date, the patient was educated on role of OT, plan of care, pacing throughout tasks.    The response to education was: Verbalizes understanding, Demonstrates understanding and Needs reinforcement                                                    SUBJECTIVE:     Subjective: pt agreeable to therapy session, \"I just feel a little unsteady.\" (02/29/20 1110)  Subjective/Objective  Comments: pt seated in chair at end of session, call light in reach (02/29/20 1110)    OBJECTIVE:Basic Lines: Telemetry;IV (02/29/20 0840)  Safety Measures: (call light in reach ) (02/29/20 0840)    RN reported Velasquez Fall Scale Score: 45       Last 24 hours of Functional Data     ADLs  Self Cares/ADL's  Grooming Assistance: Supervision;Standing at sink (02/29/20 1110)  Grooming/Oral Hygiene Deficit: Wash/dry face;Wash/dry hands (02/29/20 1110)  Upper Body Dressing Assistance: Minimal Assist (Min) (02/29/20 1110)  Upper Body Dressing Deficit: Fasteners;Pull around back;Thread RUE;Thread LUE (02/29/20 1110)  Footwear Assistance: Modified independent(long sitting in bed) (02/29/20 1110)  Lower Body Dressing Deficit: Don/doff R sock;Don/doff L sock (02/29/20 1110)  Toileting Assistance: Modified independent (02/29/20 1110)  Toileting Deficit: Clothing management up;Clothing management down;Perineal hygiene (02/29/20 1110)  Toileting Equipment Used: Grab bar use (02/29/20 1110)    Household mobility  Household Mobility  Supine to Sit: Modified Independent (02/29/20 1110)  Sit to Stand: Modified Independent (02/29/20 1110)  Stand to Sit: Modified Independent (02/29/20 1110)  Toilet Transfers: Modified Independent (02/29/20 1110)  Sitting - Static: Independent (02/29/20 1110)  Sitting - Dynamic: Independent (02/29/20 1110)  Standing - Static: Independent (02/29/20 1110)  Standing - Dynamic: Touching/Steadying Assistance;Supervision (02/29/20 1110)  Household Mobility Comments #1: touching/steadying assist for functional mobility tasks in room and on unit while pushing IV pole for support, slow, shuffled steps throughout. (02/29/20 1110)    Home Management       Other Interventions         Tolerance  OT Activity Tolerance  Activity Tolerance: 1:1 Activity to rest (02/29/20 1110)  Activity Tolerance Comments: fair overall (02/29/20 1110)    Cognition       Patient's Personal Goal: Return home (02/28/20 2774)    Therapy  Goals:    Goals  Short Term Goals to Be Reviewed On: 03/06/20 (02/28/20 1125)  Short Term Goals Are The Same as Discharge Goals: Yes (02/28/20 1125)  Goal Agreement: Patient agrees with goals and treatment plan (02/28/20 1125)  Grooming Discharge Goal 1: Modified Independent  (02/28/20 1125)  Bathing Discharge Goal 1: Modified Independent  (02/28/20 1125)  Dressing Discharge Goal 1: Modified Independent  (02/28/20 1125)  Toileting Discharge Goal 1: Modified Independent  (02/28/20 1125)  Home Setting Transfer Discharge Goal 1: Modified Independent  (02/28/20 1125)  Home Management Discharge Goal 1: item retrieval/transport with Modified Independent  (02/28/20 1125)        Total Treatment Time:  OT Time Spent: 53 minutes (02/29/20 1110)    See OT flowsheet for full details regarding the OT therapy provided.

## 2023-07-31 ENCOUNTER — TELEPHONE (OUTPATIENT)
Dept: PAIN MANAGEMENT | Age: 83
End: 2023-07-31

## 2023-07-31 NOTE — TELEPHONE ENCOUNTER
Call to Myah Lorenzana that procedure was approved for 8/7/2023 and that Kiran Drummond should call her a few days before for the pre op call and between 2:00 PM and 4:00 PM  the business day before with the arrival time. Instructed Matilde to hold ibuprofen for 24 hours, naprosyn for 4 days and any aspirin containing products or fish oil for 7 days. Instructed to call office back if any questions. Matilde verbalized understanding.     Electronically signed by Treva Alpers, RN on 7/31/2023 at 2:38 PM

## 2023-08-01 NOTE — PROGRESS NOTES
[address] [facility] PAIN MANAGEMENT  INSTRUCTIONS  . .......................................................................................................................................... [x] Parking the day of Surgery is located in the [facility].   Upon entering the door, make immediate right into the surgery reception room    [x]  Bring photo ID and insurance card     [x] You may have a light breakfast day of procedure    [x]  Wear loose comfortable clothing    [x]  Please follow instructions for medications as given per Dr's office    [x] You can expect a call the business day prior to procedure to notify you of your arrival time     [x] Please arrange for     []  Other instructions

## 2023-08-07 ENCOUNTER — HOSPITAL ENCOUNTER (OUTPATIENT)
Dept: GENERAL RADIOLOGY | Age: 83
Setting detail: OUTPATIENT SURGERY
Discharge: HOME | End: 2023-08-09
Attending: ANESTHESIOLOGY
Payer: MEDICARE

## 2023-08-07 ENCOUNTER — HOSPITAL ENCOUNTER (OUTPATIENT)
Age: 83
Setting detail: OUTPATIENT SURGERY
Discharge: HOME | End: 2023-08-07
Attending: ANESTHESIOLOGY | Admitting: ANESTHESIOLOGY
Payer: MEDICARE

## 2023-08-07 VITALS
WEIGHT: 125 LBS | HEIGHT: 63 IN | BODY MASS INDEX: 22.15 KG/M2 | OXYGEN SATURATION: 99 % | SYSTOLIC BLOOD PRESSURE: 172 MMHG | HEART RATE: 80 BPM | RESPIRATION RATE: 18 BRPM | DIASTOLIC BLOOD PRESSURE: 80 MMHG

## 2023-08-07 DIAGNOSIS — R52 PAIN MANAGEMENT: ICD-10-CM

## 2023-08-07 PROCEDURE — 64484 NJX AA&/STRD TFRM EPI L/S EA: CPT | Performed by: ANESTHESIOLOGY

## 2023-08-07 PROCEDURE — 3600000002 HC SURGERY LEVEL 2 BASE: Performed by: ANESTHESIOLOGY

## 2023-08-07 PROCEDURE — 2709999900 HC NON-CHARGEABLE SUPPLY: Performed by: ANESTHESIOLOGY

## 2023-08-07 PROCEDURE — 64483 NJX AA&/STRD TFRM EPI L/S 1: CPT | Performed by: ANESTHESIOLOGY

## 2023-08-07 PROCEDURE — 7100000010 HC PHASE II RECOVERY - FIRST 15 MIN: Performed by: ANESTHESIOLOGY

## 2023-08-07 PROCEDURE — 7100000011 HC PHASE II RECOVERY - ADDTL 15 MIN: Performed by: ANESTHESIOLOGY

## 2023-08-07 PROCEDURE — 6360000002 HC RX W HCPCS: Performed by: ANESTHESIOLOGY

## 2023-08-07 PROCEDURE — 2500000003 HC RX 250 WO HCPCS: Performed by: ANESTHESIOLOGY

## 2023-08-07 PROCEDURE — 6360000004 HC RX CONTRAST MEDICATION: Performed by: ANESTHESIOLOGY

## 2023-08-07 RX ORDER — LIDOCAINE HYDROCHLORIDE 5 MG/ML
INJECTION, SOLUTION INFILTRATION; INTRAVENOUS PRN
Status: DISCONTINUED | OUTPATIENT
Start: 2023-08-07 | End: 2023-08-07 | Stop reason: ALTCHOICE

## 2023-08-07 RX ORDER — SODIUM CHLORIDE 0.9 % (FLUSH) 0.9 %
5-40 SYRINGE (ML) INJECTION PRN
Status: DISCONTINUED | OUTPATIENT
Start: 2023-08-07 | End: 2023-08-07 | Stop reason: HOSPADM

## 2023-08-07 RX ORDER — SODIUM CHLORIDE 9 MG/ML
INJECTION, SOLUTION INTRAVENOUS PRN
Status: DISCONTINUED | OUTPATIENT
Start: 2023-08-07 | End: 2023-08-07 | Stop reason: HOSPADM

## 2023-08-07 RX ORDER — DEXAMETHASONE SODIUM PHOSPHATE 10 MG/ML
INJECTION, SOLUTION INTRAMUSCULAR; INTRAVENOUS PRN
Status: DISCONTINUED | OUTPATIENT
Start: 2023-08-07 | End: 2023-08-07 | Stop reason: ALTCHOICE

## 2023-08-07 RX ORDER — SODIUM CHLORIDE 0.9 % (FLUSH) 0.9 %
5-40 SYRINGE (ML) INJECTION EVERY 12 HOURS SCHEDULED
Status: DISCONTINUED | OUTPATIENT
Start: 2023-08-07 | End: 2023-08-07 | Stop reason: HOSPADM

## 2023-08-07 ASSESSMENT — PAIN - FUNCTIONAL ASSESSMENT
PAIN_FUNCTIONAL_ASSESSMENT: PREVENTS OR INTERFERES SOME ACTIVE ACTIVITIES AND ADLS
PAIN_FUNCTIONAL_ASSESSMENT: 0-10

## 2023-08-07 ASSESSMENT — PAIN DESCRIPTION - DESCRIPTORS: DESCRIPTORS: ACHING;CRUSHING;DISCOMFORT

## 2023-08-07 NOTE — OP NOTE
Operative Note      Patient: Clara Liu  YOB: 1940  MRN: [id number]    Date of Procedure: 2023    Pre-Op Diagnosis Codes:     * Lumbar radiculopathy [M54.16]    Post-Op Diagnosis: Same       Procedure(s):  LUMBAR TRANSFORAMINAL EPIDURAL STEROID INJECTION RIGHT L2 AND L3 UNDER FLUOROSCOPIC GUIDANCE    Surgeon(s):  Danni Cohen MD    Assistant:   * No surgical staff found *    Anesthesia: Local    Estimated Blood Loss (mL): Minimal    Complications: None    Specimens:   * No specimens in log *    Implants:  * No implants in log *      Drains: * No LDAs found *    Findings: good needle placement        Detailed Description of Procedure:   2023    Patient: Clara Liu  :  1940  Age:  80 y.o. Sex:  female     PRE-OPERATIVE DIAGNOSIS: Lumbar disc displacement, lumbar neural foraminal stenosis, lumbar radiculopathy. POST-OPERATIVE DIAGNOSIS: Same. PROCEDURE: Right Transforaminal epidural steroid injection under fluoroscopic guidance at foraminal level L2 & L3.    SURGEON: Danni Cohen MD    ANESTHESIA: Local    ESTIMATED BLOOD LOSS: None.  ______________________________________________________________________  BRIEF HISTORY: Clara Liu comes in today for the  Right transforaminal epidural steroid injection under fluoroscopic guidance at foraminal level L2 & L3 . The potential complications of this procedure were discussed with her again today. She has elected to undergo the aforementioned procedure. Anastacia complete History & Physical examination were reviewed in depth, a copy of which is in the chart. DESCRIPTION OF PROCEDURE:    After confirming written and informed consent, a time-out was performed and Anastacia name and date of birth, the procedure to be performed as well as the plan for the location of the needle insertion were confirmed.     The patient was brought into the procedure room and placed in the prone position on the

## 2023-08-07 NOTE — INTERVAL H&P NOTE
Update History & Physical    The patient's History and Physical of July 26, 2023 was reviewed with the patient and I examined the patient. There was no change. The surgical site was confirmed by the patient and me. Plan: The risks, benefits, expected outcome, and alternative to the recommended procedure have been discussed with the patient. Patient understands and wants to proceed with the procedure.      Electronically signed by Karlo Tran MD on 8/7/2023

## 2023-08-07 NOTE — DISCHARGE INSTRUCTIONS
[facility]  Dr. Juan Jose Pagan Block/Radiofrequency  Home Going Instructions    1-Go home, rest for the remainder of the day  2-Please do not lift over 20 pounds the day of the injection  3-If you received sedation No: alcohol, driving, operating lawn mowers, plows, tractors or other dangerous equipment until next morning. Do not make important decisions or sign legal documents for 24 hours. You may experience light headedness, dizziness, nausea or sleepiness after sedation. Do not stay alone. A responsible adult must be with you for 24 hours. You could be nauseated from the medications you have received. Your IV site may be sore and bruised. 4-No dietary restrictions     5-Resume all medications the same day, blood thinners to be resumed 24 hours after injection if you were instructed to stop any. 6-Keep the surgical site clean and dry, you may shower the next morning and remove the      dressing. 7- No sitz baths, tub baths or hot tubs/swimming for 24 hours. 8- If you have any pain at the injection site(s), application of an ice pack to the area should be       helpful, 20 minutes on/20 minutes off for next 48 hours. 9- Call [facility] immediately at if you develop.   Fever greater than 100.4 F  Have bleeding or drainage from the puncture site  Have progressive Leg/arm numbness and or weakness  Loss of control of bowel and or bladder (wet/soil yourself)  Severe headache with inability to lift head  10-You may return to work the next day

## 2023-09-11 ENCOUNTER — OFFICE VISIT (OUTPATIENT)
Dept: PAIN MANAGEMENT | Age: 83
End: 2023-09-11
Payer: MEDICARE

## 2023-09-11 ENCOUNTER — PREP FOR PROCEDURE (OUTPATIENT)
Dept: PAIN MANAGEMENT | Age: 83
End: 2023-09-11

## 2023-09-11 VITALS
WEIGHT: 125 LBS | HEIGHT: 63 IN | HEART RATE: 88 BPM | RESPIRATION RATE: 18 BRPM | TEMPERATURE: 98.4 F | SYSTOLIC BLOOD PRESSURE: 150 MMHG | DIASTOLIC BLOOD PRESSURE: 81 MMHG | BODY MASS INDEX: 22.15 KG/M2 | OXYGEN SATURATION: 97 %

## 2023-09-11 DIAGNOSIS — M51.36 DDD (DEGENERATIVE DISC DISEASE), LUMBAR: ICD-10-CM

## 2023-09-11 DIAGNOSIS — Z96.651 CHRONIC KNEE PAIN AFTER TOTAL REPLACEMENT OF RIGHT KNEE JOINT: ICD-10-CM

## 2023-09-11 DIAGNOSIS — G89.29 CHRONIC KNEE PAIN AFTER TOTAL REPLACEMENT OF RIGHT KNEE JOINT: ICD-10-CM

## 2023-09-11 DIAGNOSIS — M53.3 DISORDER OF SACRUM: ICD-10-CM

## 2023-09-11 DIAGNOSIS — M53.3 SACROILIAC DYSFUNCTION: Primary | ICD-10-CM

## 2023-09-11 DIAGNOSIS — M79.2 NEURALGIA AND NEURITIS: ICD-10-CM

## 2023-09-11 DIAGNOSIS — G89.29 CHRONIC PAIN OF RIGHT KNEE: ICD-10-CM

## 2023-09-11 DIAGNOSIS — M25.561 CHRONIC KNEE PAIN AFTER TOTAL REPLACEMENT OF RIGHT KNEE JOINT: Primary | ICD-10-CM

## 2023-09-11 DIAGNOSIS — G89.29 CHRONIC KNEE PAIN AFTER TOTAL REPLACEMENT OF RIGHT KNEE JOINT: Primary | ICD-10-CM

## 2023-09-11 DIAGNOSIS — M47.817 LUMBOSACRAL SPONDYLOSIS WITHOUT MYELOPATHY: ICD-10-CM

## 2023-09-11 DIAGNOSIS — Z96.651 CHRONIC KNEE PAIN AFTER TOTAL REPLACEMENT OF RIGHT KNEE JOINT: Primary | ICD-10-CM

## 2023-09-11 DIAGNOSIS — M25.561 CHRONIC PAIN OF RIGHT KNEE: ICD-10-CM

## 2023-09-11 DIAGNOSIS — M25.561 CHRONIC KNEE PAIN AFTER TOTAL REPLACEMENT OF RIGHT KNEE JOINT: ICD-10-CM

## 2023-09-11 PROCEDURE — G8428 CUR MEDS NOT DOCUMENT: HCPCS | Performed by: ANESTHESIOLOGY

## 2023-09-11 PROCEDURE — G8420 CALC BMI NORM PARAMETERS: HCPCS | Performed by: ANESTHESIOLOGY

## 2023-09-11 PROCEDURE — 1090F PRES/ABSN URINE INCON ASSESS: CPT | Performed by: ANESTHESIOLOGY

## 2023-09-11 PROCEDURE — 99213 OFFICE O/P EST LOW 20 MIN: CPT | Performed by: ANESTHESIOLOGY

## 2023-09-11 PROCEDURE — 1036F TOBACCO NON-USER: CPT | Performed by: ANESTHESIOLOGY

## 2023-09-11 PROCEDURE — 1123F ACP DISCUSS/DSCN MKR DOCD: CPT | Performed by: ANESTHESIOLOGY

## 2023-09-11 PROCEDURE — G8399 PT W/DXA RESULTS DOCUMENT: HCPCS | Performed by: ANESTHESIOLOGY

## 2023-09-11 PROCEDURE — 99214 OFFICE O/P EST MOD 30 MIN: CPT | Performed by: ANESTHESIOLOGY

## 2023-09-11 RX ORDER — SODIUM CHLORIDE 0.9 % (FLUSH) 0.9 %
5-40 SYRINGE (ML) INJECTION PRN
Status: CANCELLED | OUTPATIENT
Start: 2023-09-11

## 2023-09-11 RX ORDER — SODIUM CHLORIDE 9 MG/ML
INJECTION, SOLUTION INTRAVENOUS PRN
Status: CANCELLED | OUTPATIENT
Start: 2023-09-11

## 2023-09-11 RX ORDER — SODIUM CHLORIDE 0.9 % (FLUSH) 0.9 %
5-40 SYRINGE (ML) INJECTION EVERY 12 HOURS SCHEDULED
Status: CANCELLED | OUTPATIENT
Start: 2023-09-11

## 2023-09-11 NOTE — H&P (VIEW-ONLY)
Tremors:None bilaterally upper and lower  Edema +     Neurological:     Sensory:normal to light touch- except for diminished sensation over the right LE around the knee. Motor: No focal deficits. Right Knee:   Scar form the prior surgery noted- healed well,  Mild swelling noted, No redness/ discoloration  No dysesthesia/ allodynia  Tenderness +, ROM some discomfort noted. Dermatology:     Skin:no unusual rashes, no skin lesions, no palpable subcutaneous nodules and good skin turgor    Assessment/Plan:   Diagnosis Orders   1. Sacroiliac dysfunction        2. Lumbosacral spondylosis without myelopathy        3. DDD (degenerative disc disease), lumbar        4. Chronic pain of right knee        5. Chronic knee pain after total replacement of right knee joint        6. Neuralgia and neuritis          H/o low back and right LE pain. Failed conservative treatment. MRI findings reviewed and discussed. Pain mainly over the right thigh upto the knee. H/o right knee pain: S/p right knee replacement in June 2022- having ongoing pain. Work up by ortho at [facility] apparently unremarkable. Features of neuropathic pain. X-ray of the right knee done at [facility] reviewed-no fractures or loosening of hardware    Compound cream for local use- has used - did not note much benefit. S/P Lumbar TFESI- minimal relief. B/l SIJ bothering much. Doing HEP. PLAN:  Will do B/l SIJ injection under fluoroscopy. RBA discussed. OK to continue ASA-81 mg for SIJ procedure. Will do right knee genicular nerve block under fluoroscopic guidance after the SIJ injection. ZT lido patch for local use- samples and script-given    Counseling : reg regular HEP, planned treatment done. Derek Hartman MD    CC:  Florin Irene MD    NOTE: The above documentation was prepared using voice recognition software.   Every attempt was made to ensure accuracy but there may be spelling, grammatical, and

## 2023-09-11 NOTE — PROGRESS NOTES
[facility]   [place], [address] [place] [address]  Dept: [phone]      Follow up Note      Nury Pride     Date of Visit:  9/11/2023    CC:    Chief Complaint   Patient presents with    Follow-up     LUMBAR TRANSFORAMINAL EPIDURAL STEROID INJECTION RIGHT L2 AND L3 UNDER FLUOROSCOPIC GUIDANCE     HPI:    Low back pain:  H/o Low back pain and right LE pain. S/P SIJ injection in 2021- good pain relief of SIJ pain. Right Knee pain:  H/o OA right knee- has undergone right knee replacement in June 2022 at [facility]. Continues to have right knee pain, intermittent swelling. Apparently has been evaluated by [facility] ortho- X ray apparently shows good hardware placement. She has noticed numbness over the right lateral knee since surgery. Nursing notes and details of the pain history reviewed. Please see intake notes for details. Prior treatment :  PT/ HEP    Medications    Interventions    Surgery : right knee replacement. No prior lumbar sine surgeries. Anticoagulants: ASA-81 mg     Imaging studies:    X-ray right knee : 7/6/2023:  Impression right knees/p total knee arthroplasty with no obvious findings of fracture or loosening. MRI of LS spine: 7/2019:  Impression       1. Degenerative spondylotic changes. 2. T12-L1: Midline to left-sided disc protrusion encroaching on the   left lateral recess. 3. L1-2: Right lateral to far lateral disc protrusion encroaching   moderately on the right lateral recess and also encroaching on the   right intervertebral foramen mildly crowding the exiting right L1   nerve root. 4. L2-3: Disc bulging with mild to moderate canalicular stenosis and   also encroachment on the right intervertebral foramen mildly crowding   the exiting right L2 nerve root. Potential Aberrant Drug-Related Behavior:  no    Urine Drug Screening:    OARRS report[de-identified] Reviewed.     Past Medical History:   Diagnosis Date    Arthritis     History of double vision

## 2023-09-11 NOTE — PROGRESS NOTES
Kirsten Callaway presents to the [facility] on 9/11/2023. Selena Winn is complaining of pain low back, right knee. The pain is constant. The pain is described as aching, throbbing, burning, penetrating, nagging, and miserable. Pain is rated on her best day at a 4, on her worst day at a 8, and on average at a 6 on the VAS scale. She took her last dose of Tylenol today. Any procedures since your last visit: Yes, LUMBAR TRANSFORAMINAL EPIDURAL STEROID INJECTION RIGHT L2 AND L3 UNDER FLUOROSCOPIC GUIDANCE     Pacemaker or defibrillator: No  She is not on NSAIDS and is  on anticoagulation medications to include ASA and is managed by PCP. Medication Contract and Consent for Opioid Use Documents Filed       Patient Documents       Type of Document Status Date Received Received By Description    Medication Contract [Status Missing]                       There were no vitals taken for this visit. No LMP recorded.  Patient is postmenopausal.

## 2023-09-19 ENCOUNTER — TELEPHONE (OUTPATIENT)
Dept: PAIN MANAGEMENT | Age: 83
End: 2023-09-19

## 2023-09-19 PROBLEM — M53.3 DISORDER OF SACRUM: Status: ACTIVE | Noted: 2023-09-11

## 2023-09-19 NOTE — TELEPHONE ENCOUNTER
Snehal Ellison called staling she has been on antibiotics due to dental issue She reports she will complete the 10 days of antibiotics tomorrow. She is scheduled for a sacroiliac joint injection next Monday 9/25. I advised her I would review with Dr. Nicolasa Ramirez and call her back. Fidel Barrientos

## 2023-09-21 RX ORDER — IBUPROFEN 200 MG
200 TABLET ORAL EVERY 6 HOURS PRN
COMMUNITY

## 2023-09-21 NOTE — PROGRESS NOTES
[address] [facility] PAIN MANAGEMENT  INSTRUCTIONS  . .......................................................................................................................................... [x] Parking the day of Surgery is located in the [facility].   Upon entering the door, make immediate right into the surgery reception room    [x]  Bring photo ID and insurance card     [x] You may have a light breakfast day of procedure    [x]  Wear loose comfortable clothing    [x]  Please follow instructions for medications as given per Dr's office    [x] You can expect a call the business day prior to procedure to notify you of your arrival time     [x] Please arrange for     []  Other instructions

## 2023-09-25 ENCOUNTER — HOSPITAL ENCOUNTER (OUTPATIENT)
Age: 83
Setting detail: OUTPATIENT SURGERY
Discharge: HOME | End: 2023-09-25
Attending: ANESTHESIOLOGY | Admitting: ANESTHESIOLOGY
Payer: MEDICARE

## 2023-09-25 ENCOUNTER — HOSPITAL ENCOUNTER (OUTPATIENT)
Dept: GENERAL RADIOLOGY | Age: 83
Setting detail: OUTPATIENT SURGERY
Discharge: HOME | End: 2023-09-27
Attending: ANESTHESIOLOGY
Payer: MEDICARE

## 2023-09-25 VITALS
HEART RATE: 84 BPM | BODY MASS INDEX: 22.68 KG/M2 | OXYGEN SATURATION: 95 % | WEIGHT: 128 LBS | RESPIRATION RATE: 16 BRPM | SYSTOLIC BLOOD PRESSURE: 172 MMHG | DIASTOLIC BLOOD PRESSURE: 74 MMHG | HEIGHT: 63 IN | TEMPERATURE: 98 F

## 2023-09-25 DIAGNOSIS — R52 PAIN MANAGEMENT: ICD-10-CM

## 2023-09-25 PROCEDURE — 6360000002 HC RX W HCPCS: Performed by: ANESTHESIOLOGY

## 2023-09-25 PROCEDURE — 27096 INJECT SACROILIAC JOINT: CPT | Performed by: ANESTHESIOLOGY

## 2023-09-25 PROCEDURE — 2500000003 HC RX 250 WO HCPCS: Performed by: ANESTHESIOLOGY

## 2023-09-25 PROCEDURE — 7100000011 HC PHASE II RECOVERY - ADDTL 15 MIN: Performed by: ANESTHESIOLOGY

## 2023-09-25 PROCEDURE — 7100000010 HC PHASE II RECOVERY - FIRST 15 MIN: Performed by: ANESTHESIOLOGY

## 2023-09-25 PROCEDURE — 6360000004 HC RX CONTRAST MEDICATION: Performed by: ANESTHESIOLOGY

## 2023-09-25 PROCEDURE — 3600000002 HC SURGERY LEVEL 2 BASE: Performed by: ANESTHESIOLOGY

## 2023-09-25 PROCEDURE — 2709999900 HC NON-CHARGEABLE SUPPLY: Performed by: ANESTHESIOLOGY

## 2023-09-25 RX ORDER — SODIUM CHLORIDE 0.9 % (FLUSH) 0.9 %
5-40 SYRINGE (ML) INJECTION PRN
Status: DISCONTINUED | OUTPATIENT
Start: 2023-09-25 | End: 2023-09-25 | Stop reason: HOSPADM

## 2023-09-25 RX ORDER — METHYLPREDNISOLONE ACETATE 40 MG/ML
INJECTION, SUSPENSION INTRA-ARTICULAR; INTRALESIONAL; INTRAMUSCULAR; SOFT TISSUE PRN
Status: DISCONTINUED | OUTPATIENT
Start: 2023-09-25 | End: 2023-09-25 | Stop reason: ALTCHOICE

## 2023-09-25 RX ORDER — LIDOCAINE HYDROCHLORIDE 5 MG/ML
INJECTION, SOLUTION INFILTRATION; INTRAVENOUS PRN
Status: DISCONTINUED | OUTPATIENT
Start: 2023-09-25 | End: 2023-09-25 | Stop reason: ALTCHOICE

## 2023-09-25 RX ORDER — BUPIVACAINE HYDROCHLORIDE 2.5 MG/ML
INJECTION, SOLUTION EPIDURAL; INFILTRATION; INTRACAUDAL PRN
Status: DISCONTINUED | OUTPATIENT
Start: 2023-09-25 | End: 2023-09-25 | Stop reason: ALTCHOICE

## 2023-09-25 RX ORDER — SODIUM CHLORIDE 0.9 % (FLUSH) 0.9 %
5-40 SYRINGE (ML) INJECTION EVERY 12 HOURS SCHEDULED
Status: DISCONTINUED | OUTPATIENT
Start: 2023-09-25 | End: 2023-09-25 | Stop reason: HOSPADM

## 2023-09-25 RX ORDER — IOPAMIDOL 612 MG/ML
INJECTION, SOLUTION INTRATHECAL PRN
Status: DISCONTINUED | OUTPATIENT
Start: 2023-09-25 | End: 2023-09-25 | Stop reason: ALTCHOICE

## 2023-09-25 RX ORDER — SODIUM CHLORIDE 9 MG/ML
INJECTION, SOLUTION INTRAVENOUS PRN
Status: DISCONTINUED | OUTPATIENT
Start: 2023-09-25 | End: 2023-09-25 | Stop reason: HOSPADM

## 2023-09-25 ASSESSMENT — PAIN DESCRIPTION - LOCATION: LOCATION: BACK

## 2023-09-25 ASSESSMENT — PAIN DESCRIPTION - PAIN TYPE: TYPE: CHRONIC PAIN

## 2023-09-25 ASSESSMENT — PAIN SCALES - GENERAL: PAINLEVEL_OUTOF10: 6

## 2023-09-25 ASSESSMENT — PAIN DESCRIPTION - DESCRIPTORS: DESCRIPTORS: ACHING

## 2023-09-25 ASSESSMENT — PAIN DESCRIPTION - ORIENTATION: ORIENTATION: MID

## 2023-09-25 NOTE — OP NOTE
Operative Note      Patient: Negar Corado  YOB: 1940  MRN: [id number]    Date of Procedure: 2023    Pre-Op Diagnosis Codes:     * Disorder of sacrum [M53.3]    Post-Op Diagnosis: Same       Procedure(s):  BILATERAL SACROILIAC JOINT INJECTION UNDER FLUOROSCOPIC GUIDANCE    Surgeon(s):  Janes Calles MD    Assistant:   * No surgical staff found *    Anesthesia: Local    Estimated Blood Loss (mL): Minimal    Complications: None    Specimens:   * No specimens in log *    Implants:  * No implants in log *      Drains: * No LDAs found *    Findings: good needle placement        Detailed Description of Procedure:   2023    Patient: Negar Corado  :  1940  Age:  80 y.o. Sex:  female     PRE-OPERATIVE DIAGNOSIS:   Sacroiliac dysfunction    POST-OPERATIVE DIAGNOSIS: Same. PROCEDURE:  Fluoroscopic guided Bilateral   sacroiliac joint injection with steroid. SURGEON:  Janes Calles MD    ANESTHESIA: Local    ESTIMATED BLOOD LOSS: None.  ______________________________________________________________________  BRIEF HISTORY:  Negar Corado comes in today for  Bilateral sacroiliac joint injection under fluoroscopic guidance. The potential complications as well as the procedure in detail were explained to her today. She has elected to undergo the aforementioned procedure. Matilde's complete History & Physical examination were reviewed in depth, a copy of which is in the chart. DESCRIPTION OF PROCEDURE:    After confirming written and informed consent, a time-out was performed and Anastacia name and date of birth, the procedure to be performed as well as the plan for the location of the needle insertion were confirmed. The patient was brought into the procedure room and placed in the prone position on the fluoroscopy table. Standard monitors were placed and vital signs were observed throughout the procedure.  The low back and upper buttocks area was prepped

## 2023-09-25 NOTE — INTERVAL H&P NOTE
Update History & Physical    The patient's History and Physical of September 11, 2023 was reviewed with the patient and I examined the patient. There was no change. The surgical site was confirmed by the patient and me. Plan: The risks, benefits, expected outcome, and alternative to the recommended procedure have been discussed with the patient. Patient understands and wants to proceed with the procedure.      Electronically signed by Maria Isabel Loyola MD on 9/25/2023

## 2023-09-25 NOTE — DISCHARGE INSTRUCTIONS
Yohan Day Block/Radiofrequency  Home Going Instructions    1-Go home, rest for the remainder of the day  2-Please do not lift over 20 pounds the day of the injection  3-If you received sedation No: alcohol, driving, operating lawn mowers, plows, tractors or other dangerous equipment until next morning. Do not make important decisions or sign legal documents for 24 hours. You may experience light headedness, dizziness, nausea or sleepiness after sedation. Do not stay alone. A responsible adult must be with you for 24 hours. You could be nauseated from the medications you have received. Your IV site may be sore and bruised. 4-No dietary restrictions     5-Resume all medications the same day, blood thinners to be resumed 24 hours after injection if you were instructed to stop any. 6-Keep the surgical site clean and dry, you may shower the next morning and remove the      dressing. 7- No sitz baths, tub baths or hot tubs/swimming for 24 hours. 8- If you have any pain at the injection site(s), application of an ice pack to the area should be       helpful, 20 minutes on/20 minutes off for next 48 hours. 9- Call [facility] immediately at if you develop.   Fever greater than 100.4 F  Have bleeding or drainage from the puncture site  Have progressive Leg/arm numbness and or weakness  Loss of control of bowel and or bladder (wet/soil yourself)  Severe headache with inability to lift head  10-You may return to work the next day

## 2023-10-03 ENCOUNTER — TELEPHONE (OUTPATIENT)
Dept: PAIN MANAGEMENT | Age: 83
End: 2023-10-03

## 2023-10-03 DIAGNOSIS — G89.29 CHRONIC KNEE PAIN AFTER TOTAL REPLACEMENT OF RIGHT KNEE JOINT: Primary | ICD-10-CM

## 2023-10-03 DIAGNOSIS — M25.561 CHRONIC KNEE PAIN AFTER TOTAL REPLACEMENT OF RIGHT KNEE JOINT: Primary | ICD-10-CM

## 2023-10-03 DIAGNOSIS — M79.2 NEURALGIA AND NEURITIS: ICD-10-CM

## 2023-10-03 DIAGNOSIS — Z96.651 CHRONIC KNEE PAIN AFTER TOTAL REPLACEMENT OF RIGHT KNEE JOINT: Primary | ICD-10-CM

## 2023-10-03 NOTE — TELEPHONE ENCOUNTER
Call to Mack Martinez that procedure was approved for 10/9/2023 and that Kiran Drummond should call her a few days before for the pre op call and between 2:00 PM and 4:00 PM  the business day before with the arrival time. Instructed Matilde to hold ibuprofen for 24 hours, naprosyn for 4 days and any aspirin containing products or fish oil for 7 days. Instructed to call office back if any questions. Matilde verbalized understanding.     Electronically signed by Nir Duong RN on 10/3/2023 at 10:31 AM

## 2023-10-04 RX ORDER — SIMVASTATIN 20 MG
20 TABLET ORAL NIGHTLY
COMMUNITY

## 2023-10-04 NOTE — PROGRESS NOTES
[address] [facility] PAIN MANAGEMENT  INSTRUCTIONS  . .......................................................................................................................................... [x] Parking the day of Surgery is located in the [facility].   Upon entering the door, make immediate right into the surgery reception room    [x]  Bring photo ID and insurance card     [x] You may have a light breakfast day of procedure    [x]  Wear loose comfortable clothing    [x]  Please follow instructions for medications as given per Dr's office    [x] You can expect a call the business day prior to procedure to notify you of your arrival time     [x] Please arrange for     []  Other instructions

## 2023-10-09 ENCOUNTER — HOSPITAL ENCOUNTER (OUTPATIENT)
Age: 83
Setting detail: OUTPATIENT SURGERY
Discharge: HOME | End: 2023-10-09
Attending: ANESTHESIOLOGY | Admitting: ANESTHESIOLOGY
Payer: MEDICARE

## 2023-10-09 ENCOUNTER — HOSPITAL ENCOUNTER (OUTPATIENT)
Dept: GENERAL RADIOLOGY | Age: 83
Setting detail: OUTPATIENT SURGERY
Discharge: HOME | End: 2023-10-11
Attending: ANESTHESIOLOGY
Payer: MEDICARE

## 2023-10-09 VITALS
OXYGEN SATURATION: 100 % | RESPIRATION RATE: 18 BRPM | DIASTOLIC BLOOD PRESSURE: 81 MMHG | HEART RATE: 72 BPM | TEMPERATURE: 97.4 F | BODY MASS INDEX: 21.85 KG/M2 | WEIGHT: 128 LBS | HEIGHT: 64 IN | SYSTOLIC BLOOD PRESSURE: 168 MMHG

## 2023-10-09 DIAGNOSIS — R52 PAIN MANAGEMENT: ICD-10-CM

## 2023-10-09 PROCEDURE — 2709999900 HC NON-CHARGEABLE SUPPLY: Performed by: ANESTHESIOLOGY

## 2023-10-09 PROCEDURE — 64454 NJX AA&/STRD GNCLR NRV BRNCH: CPT | Performed by: ANESTHESIOLOGY

## 2023-10-09 PROCEDURE — 7100000011 HC PHASE II RECOVERY - ADDTL 15 MIN: Performed by: ANESTHESIOLOGY

## 2023-10-09 PROCEDURE — 3600000002 HC SURGERY LEVEL 2 BASE: Performed by: ANESTHESIOLOGY

## 2023-10-09 PROCEDURE — 6360000002 HC RX W HCPCS: Performed by: ANESTHESIOLOGY

## 2023-10-09 PROCEDURE — 2500000003 HC RX 250 WO HCPCS: Performed by: ANESTHESIOLOGY

## 2023-10-09 PROCEDURE — 7100000010 HC PHASE II RECOVERY - FIRST 15 MIN: Performed by: ANESTHESIOLOGY

## 2023-10-09 RX ORDER — SODIUM CHLORIDE 9 MG/ML
INJECTION, SOLUTION INTRAVENOUS PRN
Status: DISCONTINUED | OUTPATIENT
Start: 2023-10-09 | End: 2023-10-09 | Stop reason: HOSPADM

## 2023-10-09 RX ORDER — LIDOCAINE HYDROCHLORIDE 5 MG/ML
INJECTION, SOLUTION INFILTRATION; INTRAVENOUS PRN
Status: DISCONTINUED | OUTPATIENT
Start: 2023-10-09 | End: 2023-10-09 | Stop reason: ALTCHOICE

## 2023-10-09 RX ORDER — METHYLPREDNISOLONE ACETATE 40 MG/ML
INJECTION, SUSPENSION INTRA-ARTICULAR; INTRALESIONAL; INTRAMUSCULAR; SOFT TISSUE PRN
Status: DISCONTINUED | OUTPATIENT
Start: 2023-10-09 | End: 2023-10-09 | Stop reason: ALTCHOICE

## 2023-10-09 RX ORDER — BUPIVACAINE HYDROCHLORIDE 2.5 MG/ML
INJECTION, SOLUTION EPIDURAL; INFILTRATION; INTRACAUDAL PRN
Status: DISCONTINUED | OUTPATIENT
Start: 2023-10-09 | End: 2023-10-09 | Stop reason: ALTCHOICE

## 2023-10-09 RX ORDER — SODIUM CHLORIDE 0.9 % (FLUSH) 0.9 %
5-40 SYRINGE (ML) INJECTION EVERY 12 HOURS SCHEDULED
Status: DISCONTINUED | OUTPATIENT
Start: 2023-10-09 | End: 2023-10-09 | Stop reason: HOSPADM

## 2023-10-09 RX ORDER — ROPIVACAINE HYDROCHLORIDE 5 MG/ML
INJECTION, SOLUTION EPIDURAL; INFILTRATION; PERINEURAL PRN
Status: DISCONTINUED | OUTPATIENT
Start: 2023-10-09 | End: 2023-10-09 | Stop reason: ALTCHOICE

## 2023-10-09 RX ORDER — SODIUM CHLORIDE 0.9 % (FLUSH) 0.9 %
5-40 SYRINGE (ML) INJECTION PRN
Status: DISCONTINUED | OUTPATIENT
Start: 2023-10-09 | End: 2023-10-09 | Stop reason: HOSPADM

## 2023-10-09 ASSESSMENT — PAIN DESCRIPTION - DESCRIPTORS: DESCRIPTORS: ACHING;SHARP

## 2023-10-09 NOTE — DISCHARGE INSTRUCTIONS
[place] Block/Radiofrequency  Home Going Instructions    1-Go home, rest for the remainder of the day  2-Please do not lift over 20 pounds the day of the injection  3-If you received sedation No: alcohol, driving, operating lawn mowers, plows, tractors or other dangerous equipment until next morning. Do not make important decisions or sign legal documents for 24 hours. You may experience light headedness, dizziness, nausea or sleepiness after sedation. Do not stay alone. A responsible adult must be with you for 24 hours. You could be nauseated from the medications you have received. Your IV site may be sore and bruised. 4-No dietary restrictions     5-Resume all medications the same day, blood thinners to be resumed 24 hours after injection if you were instructed to stop any. 6-Keep the surgical site clean and dry, you may shower the next morning and remove the      dressing. 7- No sitz baths, tub baths or hot tubs/swimming for 24 hours. 8- If you have any pain at the injection site(s), application of an ice pack to the area should be       helpful, 20 minutes on/20 minutes off for next 48 hours. 9- Call [facility] immediately at if you develop.   Fever greater than 100.4 F  Have bleeding or drainage from the puncture site  Have progressive Leg/arm numbness and or weakness  Loss of control of bowel and or bladder (wet/soil yourself)  Severe headache with inability to lift head  10-You may return to work the next day

## 2023-10-09 NOTE — INTERVAL H&P NOTE
Update History & Physical    The patient's History and Physical of September 11, 2023 was reviewed with the patient and I examined the patient. There was no change. The surgical site was confirmed by the patient and me. Plan: The risks, benefits, expected outcome, and alternative to the recommended procedure have been discussed with the patient. Patient understands and wants to proceed with the procedure.      Electronically signed by Irving Simpson MD on 10/9/2023

## 2023-10-09 NOTE — OP NOTE
Operative Note      Patient: Katina Martel  YOB: 1940  MRN: [id number]    Date of Procedure: 10/9/2023    Pre-Op Diagnosis Codes:     * Right knee pain [M25.561]     * Neuralgia [M79.2]    Post-Op Diagnosis: Same       Procedure(s):  RIGHT KNEE GENICULAR NERVES BLOCK UNDER FLUOROSCOPY    Surgeon(s):  Bessy Brooke MD    Assistant:   * No surgical staff found *    Anesthesia: Local    Estimated Blood Loss (mL): Minimal    Complications: None    Specimens:   * No specimens in log *    Implants:  * No implants in log *      Drains: * No LDAs found *    Findings: good needle placement        Detailed Description of Procedure:   10/9/2023    Patient: Katina Martel  :  1940  Age:  80 y.o. Sex:  female     PREPROCEDURAL DIAGNOSES: Right Knee Pain, knee steoarthritis. POSTPROCEDURAL DIAGNOSIS: Same. PROCEDURE:  Right knee genicular (superior medial ,superior lateral,inferior medial) nerve block under fluoroscopic guidance. SURGEON: Bessy Brooke MD    ANESTHESIA: Local    ESTIMATED BLOOD LOSS: None.  ______________________________________________________________________  BRIEF HISTORY:  Katina Martel comes in today for Right genicular nerve block under fluoroscopic guidance. The potential complications as well as the procedure in detail were explained to her today. She has elected to undergo the aforementioned injection. Matilde's complete History & Physical examination were reviewed in depth, a copy of which is in the chart. DESCRIPTION OF PROCEDURE:    After confirming written and informed consent, a time-out was performed and Anastacia name and date of birth, the procedure to be performed as well as the plan for the location of the needle insertion were confirmed. The patient was brought into the procedure room and placed in the supine position on the fluoroscopy table. A small pillow was placed under the popliteal fossa to alleviate discomfort.  The

## 2023-11-09 ENCOUNTER — OFFICE VISIT (OUTPATIENT)
Dept: PAIN MANAGEMENT | Age: 83
End: 2023-11-09
Payer: MEDICARE

## 2023-11-09 ENCOUNTER — PREP FOR PROCEDURE (OUTPATIENT)
Dept: PAIN MANAGEMENT | Age: 83
End: 2023-11-09

## 2023-11-09 VITALS
DIASTOLIC BLOOD PRESSURE: 85 MMHG | HEART RATE: 102 BPM | OXYGEN SATURATION: 97 % | HEIGHT: 64 IN | BODY MASS INDEX: 21.85 KG/M2 | SYSTOLIC BLOOD PRESSURE: 159 MMHG | WEIGHT: 128 LBS

## 2023-11-09 DIAGNOSIS — M54.16 LUMBAR RADICULAR PAIN: ICD-10-CM

## 2023-11-09 DIAGNOSIS — M51.36 DDD (DEGENERATIVE DISC DISEASE), LUMBAR: Primary | ICD-10-CM

## 2023-11-09 DIAGNOSIS — M79.2 NEURALGIA AND NEURITIS: ICD-10-CM

## 2023-11-09 DIAGNOSIS — M25.561 CHRONIC KNEE PAIN AFTER TOTAL REPLACEMENT OF RIGHT KNEE JOINT: ICD-10-CM

## 2023-11-09 DIAGNOSIS — M54.16 LUMBAR RADICULAR PAIN: Primary | ICD-10-CM

## 2023-11-09 DIAGNOSIS — M47.817 LUMBOSACRAL SPONDYLOSIS WITHOUT MYELOPATHY: ICD-10-CM

## 2023-11-09 DIAGNOSIS — G89.29 CHRONIC PAIN OF RIGHT KNEE: ICD-10-CM

## 2023-11-09 DIAGNOSIS — Z79.899 ENCOUNTER FOR LONG-TERM (CURRENT) USE OF MEDICATIONS: ICD-10-CM

## 2023-11-09 DIAGNOSIS — Z96.651 CHRONIC KNEE PAIN AFTER TOTAL REPLACEMENT OF RIGHT KNEE JOINT: ICD-10-CM

## 2023-11-09 DIAGNOSIS — G89.29 CHRONIC KNEE PAIN AFTER TOTAL REPLACEMENT OF RIGHT KNEE JOINT: ICD-10-CM

## 2023-11-09 DIAGNOSIS — M25.561 CHRONIC PAIN OF RIGHT KNEE: ICD-10-CM

## 2023-11-09 LAB
SEND OUT REPORT: NORMAL
TEST NAME: NORMAL

## 2023-11-09 PROCEDURE — 1123F ACP DISCUSS/DSCN MKR DOCD: CPT | Performed by: ANESTHESIOLOGY

## 2023-11-09 PROCEDURE — G8484 FLU IMMUNIZE NO ADMIN: HCPCS | Performed by: ANESTHESIOLOGY

## 2023-11-09 PROCEDURE — 1090F PRES/ABSN URINE INCON ASSESS: CPT | Performed by: ANESTHESIOLOGY

## 2023-11-09 PROCEDURE — G8427 DOCREV CUR MEDS BY ELIG CLIN: HCPCS | Performed by: ANESTHESIOLOGY

## 2023-11-09 PROCEDURE — G8399 PT W/DXA RESULTS DOCUMENT: HCPCS | Performed by: ANESTHESIOLOGY

## 2023-11-09 PROCEDURE — G8420 CALC BMI NORM PARAMETERS: HCPCS | Performed by: ANESTHESIOLOGY

## 2023-11-09 PROCEDURE — 99214 OFFICE O/P EST MOD 30 MIN: CPT | Performed by: ANESTHESIOLOGY

## 2023-11-09 PROCEDURE — 1036F TOBACCO NON-USER: CPT | Performed by: ANESTHESIOLOGY

## 2023-11-09 PROCEDURE — 99213 OFFICE O/P EST LOW 20 MIN: CPT | Performed by: ANESTHESIOLOGY

## 2023-11-09 RX ORDER — SODIUM CHLORIDE 0.9 % (FLUSH) 0.9 %
5-40 SYRINGE (ML) INJECTION PRN
Status: CANCELLED | OUTPATIENT
Start: 2023-11-09

## 2023-11-09 RX ORDER — SODIUM CHLORIDE 9 MG/ML
INJECTION, SOLUTION INTRAVENOUS PRN
Status: CANCELLED | OUTPATIENT
Start: 2023-11-09

## 2023-11-09 RX ORDER — SODIUM CHLORIDE 0.9 % (FLUSH) 0.9 %
5-40 SYRINGE (ML) INJECTION EVERY 12 HOURS SCHEDULED
Status: CANCELLED | OUTPATIENT
Start: 2023-11-09

## 2023-11-09 NOTE — H&P (VIEW-ONLY)
[facility]   [place], [address] [place] [address]  Dept: [phone]      Follow up Note      Noemí Reyes     Date of Visit:  11/9/2023    CC:    Chief Complaint   Patient presents with    Follow Up After Procedure     RIGHT KNEE GENICULAR NERVES BLOCK UNDER FLUOROSCOPY     HPI:    Low back pain:  H/o Low back pain and right LE pain. Prior interventions had helped    Right Knee pain:  H/o OA right knee- has undergone right knee replacement in June 2022 at [facility]. Continues to have right knee pain, intermittent swelling. Apparently has been evaluated by [facility] ortho- X ray apparently shows good hardware placement. She has noticed numbness over the right lateral knee since surgery. Nursing notes and details of the pain history reviewed. Please see intake notes for details. Prior treatment :  PT/ HEP- yes    Medications    Interventions    Surgery : right knee replacement. No prior lumbar sine surgeries. Anticoagulants: ASA-81 mg     Imaging studies:    X-ray right knee : 7/6/2023:  Impression right knees/p total knee arthroplasty with no obvious findings of fracture or loosening. MRI of LS spine: 7/2019:  Impression       1. Degenerative spondylotic changes. 2. T12-L1: Midline to left-sided disc protrusion encroaching on the   left lateral recess. 3. L1-2: Right lateral to far lateral disc protrusion encroaching   moderately on the right lateral recess and also encroaching on the   right intervertebral foramen mildly crowding the exiting right L1   nerve root. 4. L2-3: Disc bulging with mild to moderate canalicular stenosis and   also encroachment on the right intervertebral foramen mildly crowding   the exiting right L2 nerve root. Potential Aberrant Drug-Related Behavior:  no    Urine Drug Screening:    OARRS report[de-identified] Reviewed.     Past Medical History:   Diagnosis Date    Arthritis     History of double vision     both  eyes    Hyperlipidemia

## 2023-11-15 ENCOUNTER — TELEPHONE (OUTPATIENT)
Dept: PAIN MANAGEMENT | Age: 83
End: 2023-11-15

## 2023-11-16 LAB
SEND OUT REPORT: NORMAL
TEST NAME: NORMAL

## 2023-11-17 ENCOUNTER — TELEPHONE (OUTPATIENT)
Dept: PAIN MANAGEMENT | Age: 83
End: 2023-11-17

## 2023-11-17 DIAGNOSIS — M54.16 LUMBAR RADICULOPATHY: ICD-10-CM

## 2023-11-17 NOTE — TELEPHONE ENCOUNTER
Call to Tia Youssef that procedure was approved for 11/20/2023 and that [facility] should call her a few days before for the pre op call and between 2:00 PM and 4:00 PM  the business day before with the arrival time. Instructed Matilde to hold ibuprofen for 24 hours, naprosyn for 4 days and any aspirin containing products or fish oil for 7 days. Instructed to call office back if any questions. Matilde verbalized understanding.     Electronically signed by Melvin Almendarez RN on 11/17/2023 at 11:19 AM

## 2023-11-17 NOTE — PROGRESS NOTES
[address] [facility] PAIN MANAGEMENT  INSTRUCTIONS  . .......................................................................................................................................... [x] Parking the day of Surgery is located in the [facility].   Upon entering the door, make immediate right into the surgery reception room    [x]  Bring photo ID and insurance card     [x] You may have a light breakfast day of procedure    [x]  Wear loose comfortable clothing    [x]  Please follow instructions for medications as given per Dr's office    [x] You can expect a call the business day prior to procedure to notify you of your arrival time     [x] Please arrange for     []  Other instructions

## 2023-11-20 ENCOUNTER — HOSPITAL ENCOUNTER (OUTPATIENT)
Dept: GENERAL RADIOLOGY | Age: 83
Setting detail: OUTPATIENT SURGERY
Discharge: HOME | End: 2023-11-22
Attending: ANESTHESIOLOGY
Payer: MEDICARE

## 2023-11-20 ENCOUNTER — HOSPITAL ENCOUNTER (OUTPATIENT)
Age: 83
Setting detail: OUTPATIENT SURGERY
Discharge: HOME | End: 2023-11-20
Attending: ANESTHESIOLOGY | Admitting: ANESTHESIOLOGY
Payer: MEDICARE

## 2023-11-20 VITALS
HEIGHT: 64 IN | OXYGEN SATURATION: 100 % | RESPIRATION RATE: 16 BRPM | BODY MASS INDEX: 21.85 KG/M2 | SYSTOLIC BLOOD PRESSURE: 168 MMHG | HEART RATE: 82 BPM | TEMPERATURE: 98 F | DIASTOLIC BLOOD PRESSURE: 74 MMHG | WEIGHT: 128 LBS

## 2023-11-20 DIAGNOSIS — R52 PAIN MANAGEMENT: ICD-10-CM

## 2023-11-20 PROCEDURE — 3600000012 HC SURGERY LEVEL 2 ADDTL 15MIN: Performed by: ANESTHESIOLOGY

## 2023-11-20 PROCEDURE — 64483 NJX AA&/STRD TFRM EPI L/S 1: CPT | Performed by: ANESTHESIOLOGY

## 2023-11-20 PROCEDURE — 2709999900 HC NON-CHARGEABLE SUPPLY: Performed by: ANESTHESIOLOGY

## 2023-11-20 PROCEDURE — 7100000010 HC PHASE II RECOVERY - FIRST 15 MIN: Performed by: ANESTHESIOLOGY

## 2023-11-20 PROCEDURE — 2500000003 HC RX 250 WO HCPCS: Performed by: ANESTHESIOLOGY

## 2023-11-20 PROCEDURE — 6360000004 HC RX CONTRAST MEDICATION: Performed by: ANESTHESIOLOGY

## 2023-11-20 PROCEDURE — 6360000002 HC RX W HCPCS: Performed by: ANESTHESIOLOGY

## 2023-11-20 PROCEDURE — 64484 NJX AA&/STRD TFRM EPI L/S EA: CPT | Performed by: ANESTHESIOLOGY

## 2023-11-20 PROCEDURE — 3600000002 HC SURGERY LEVEL 2 BASE: Performed by: ANESTHESIOLOGY

## 2023-11-20 PROCEDURE — 7100000011 HC PHASE II RECOVERY - ADDTL 15 MIN: Performed by: ANESTHESIOLOGY

## 2023-11-20 RX ORDER — IOPAMIDOL 612 MG/ML
INJECTION, SOLUTION INTRATHECAL PRN
Status: DISCONTINUED | OUTPATIENT
Start: 2023-11-20 | End: 2023-11-20 | Stop reason: ALTCHOICE

## 2023-11-20 RX ORDER — SODIUM CHLORIDE 0.9 % (FLUSH) 0.9 %
5-40 SYRINGE (ML) INJECTION PRN
Status: DISCONTINUED | OUTPATIENT
Start: 2023-11-20 | End: 2023-11-20 | Stop reason: HOSPADM

## 2023-11-20 RX ORDER — LIDOCAINE HYDROCHLORIDE 5 MG/ML
INJECTION, SOLUTION INFILTRATION; INTRAVENOUS PRN
Status: DISCONTINUED | OUTPATIENT
Start: 2023-11-20 | End: 2023-11-20 | Stop reason: ALTCHOICE

## 2023-11-20 RX ORDER — SODIUM CHLORIDE 0.9 % (FLUSH) 0.9 %
5-40 SYRINGE (ML) INJECTION EVERY 12 HOURS SCHEDULED
Status: DISCONTINUED | OUTPATIENT
Start: 2023-11-20 | End: 2023-11-20 | Stop reason: HOSPADM

## 2023-11-20 RX ORDER — SODIUM CHLORIDE 9 MG/ML
INJECTION, SOLUTION INTRAVENOUS PRN
Status: DISCONTINUED | OUTPATIENT
Start: 2023-11-20 | End: 2023-11-20 | Stop reason: HOSPADM

## 2023-11-20 RX ORDER — DEXAMETHASONE SODIUM PHOSPHATE 10 MG/ML
INJECTION, SOLUTION INTRAMUSCULAR; INTRAVENOUS PRN
Status: DISCONTINUED | OUTPATIENT
Start: 2023-11-20 | End: 2023-11-20 | Stop reason: ALTCHOICE

## 2023-11-20 ASSESSMENT — PAIN DESCRIPTION - DESCRIPTORS: DESCRIPTORS: ACHING

## 2023-11-20 ASSESSMENT — PAIN DESCRIPTION - LOCATION: LOCATION: BACK

## 2023-11-20 ASSESSMENT — PAIN DESCRIPTION - ORIENTATION: ORIENTATION: RIGHT

## 2023-11-20 ASSESSMENT — PAIN SCALES - GENERAL: PAINLEVEL_OUTOF10: 7

## 2023-11-20 ASSESSMENT — PAIN DESCRIPTION - PAIN TYPE: TYPE: CHRONIC PAIN

## 2023-11-20 NOTE — DISCHARGE INSTRUCTIONS
[facility] Patient  Dr. Obie Chase Block/Radiofrequency  Home Going Instructions    1-Go home, rest for the remainder of the day  2-Please do not lift over 20 pounds the day of the injection  3-If you received sedation No: alcohol, driving, operating lawn mowers, plows, tractors or other dangerous equipment until next morning. Do not make important decisions or sign legal documents for 24 hours. You may experience light headedness, dizziness, nausea or sleepiness after sedation. Do not stay alone. A responsible adult must be with you for 24 hours. You could be nauseated from the medications you have received. Your IV site may be sore and bruised. 4-No dietary restrictions     5-Resume all medications the same day, blood thinners to be resumed 24 hours after injection if you were instructed to stop any. 6-Keep the surgical site clean and dry, you may shower the next morning and remove the      dressing. 7- No sitz baths, tub baths or hot tubs/swimming for 24 hours. 8- If you have any pain at the injection site(s), application of an ice pack to the area should be       helpful, 20 minutes on/20 minutes off for next 48 hours. 9- Call [facility] immediately at if you develop.   Fever greater than 100.4 F  Have bleeding or drainage from the puncture site  Have progressive Leg/arm numbness and or weakness  Loss of control of bowel and or bladder (wet/soil yourself)  Severe headache with inability to lift head  10-You may return to work the next day

## 2023-11-20 NOTE — OP NOTE
Operative Note      Patient: Negar Corado  YOB: 1940  MRN: [id number]    Date of Procedure: 2023    Pre-Op Diagnosis Codes:     * Lumbar radiculopathy [M54.16]    Post-Op Diagnosis: Same       Procedure(s):  LUMBAR TRANSFORAMINAL EPIDURAL STEROID INJECTION RIGHT L3 AND L4 UNDER FLUOROSCOPIC GUIDANCE    Surgeon(s):  Janes Calles MD    Assistant:   * No surgical staff found *    Anesthesia: Local    Estimated Blood Loss (mL): Minimal    Complications: None    Specimens:   * No specimens in log *    Implants:  * No implants in log *      Drains: * No LDAs found *    Findings: good needle placement        Detailed Description of Procedure:   2023    Patient: Negar Corado  :  1940  Age:  80 y.o. Sex:  female     PRE-OPERATIVE DIAGNOSIS: Lumbar disc displacement, lumbar neural foraminal stenosis, lumbar radiculopathy. POST-OPERATIVE DIAGNOSIS: Same. PROCEDURE: Right Transforaminal epidural steroid injection under fluoroscopic guidance at foraminal level L3 & L4.    SURGEON: Janes Calles MD    ANESTHESIA: Local    ESTIMATED BLOOD LOSS: None.  ______________________________________________________________________  BRIEF HISTORY: Negar Corado comes in today for the  Right transforaminal epidural steroid injection under fluoroscopic guidance at foraminal level L3 & L4 . The potential complications of this procedure were discussed with her again today. She has elected to undergo the aforementioned procedure. Anastacia complete History & Physical examination were reviewed in depth, a copy of which is in the chart. DESCRIPTION OF PROCEDURE:    After confirming written and informed consent, a time-out was performed and Anastacia name and date of birth, the procedure to be performed as well as the plan for the location of the needle insertion were confirmed.     The patient was brought into the procedure room and placed in the prone position on the

## 2023-11-20 NOTE — INTERVAL H&P NOTE
Update History & Physical    The patient's History and Physical of November 9, 2023 was reviewed with the patient and I examined the patient. There was no change. The surgical site was confirmed by the patient and me. Plan: The risks, benefits, expected outcome, and alternative to the recommended procedure have been discussed with the patient. Patient understands and wants to proceed with the procedure.      Electronically signed by Vanna Merritt MD on 11/20/2023

## 2023-12-14 ENCOUNTER — OFFICE VISIT (OUTPATIENT)
Dept: PAIN MANAGEMENT | Age: 83
End: 2023-12-14
Payer: MEDICARE

## 2023-12-14 VITALS
SYSTOLIC BLOOD PRESSURE: 140 MMHG | OXYGEN SATURATION: 97 % | HEART RATE: 111 BPM | RESPIRATION RATE: 18 BRPM | WEIGHT: 128 LBS | DIASTOLIC BLOOD PRESSURE: 82 MMHG | TEMPERATURE: 97.2 F | HEIGHT: 64 IN | BODY MASS INDEX: 21.85 KG/M2

## 2023-12-14 DIAGNOSIS — M51.36 DDD (DEGENERATIVE DISC DISEASE), LUMBAR: ICD-10-CM

## 2023-12-14 DIAGNOSIS — Z96.659 CHRONIC KNEE PAIN AFTER TOTAL REPLACEMENT OF KNEE JOINT: ICD-10-CM

## 2023-12-14 DIAGNOSIS — G89.29 CHRONIC KNEE PAIN AFTER TOTAL REPLACEMENT OF KNEE JOINT: ICD-10-CM

## 2023-12-14 DIAGNOSIS — M53.3 SACROILIAC DYSFUNCTION: Primary | ICD-10-CM

## 2023-12-14 DIAGNOSIS — M25.569 CHRONIC KNEE PAIN AFTER TOTAL REPLACEMENT OF KNEE JOINT: ICD-10-CM

## 2023-12-14 DIAGNOSIS — M79.2 NEURALGIA AND NEURITIS: ICD-10-CM

## 2023-12-14 DIAGNOSIS — M47.817 LUMBOSACRAL SPONDYLOSIS WITHOUT MYELOPATHY: ICD-10-CM

## 2023-12-14 PROCEDURE — 1090F PRES/ABSN URINE INCON ASSESS: CPT | Performed by: ANESTHESIOLOGY

## 2023-12-14 PROCEDURE — 99213 OFFICE O/P EST LOW 20 MIN: CPT | Performed by: ANESTHESIOLOGY

## 2023-12-14 PROCEDURE — G8420 CALC BMI NORM PARAMETERS: HCPCS | Performed by: ANESTHESIOLOGY

## 2023-12-14 PROCEDURE — 1123F ACP DISCUSS/DSCN MKR DOCD: CPT | Performed by: ANESTHESIOLOGY

## 2023-12-14 PROCEDURE — 99214 OFFICE O/P EST MOD 30 MIN: CPT | Performed by: ANESTHESIOLOGY

## 2023-12-14 PROCEDURE — G8427 DOCREV CUR MEDS BY ELIG CLIN: HCPCS | Performed by: ANESTHESIOLOGY

## 2023-12-14 PROCEDURE — G8484 FLU IMMUNIZE NO ADMIN: HCPCS | Performed by: ANESTHESIOLOGY

## 2023-12-14 PROCEDURE — G8399 PT W/DXA RESULTS DOCUMENT: HCPCS | Performed by: ANESTHESIOLOGY

## 2023-12-14 PROCEDURE — 1036F TOBACCO NON-USER: CPT | Performed by: ANESTHESIOLOGY

## 2023-12-14 RX ORDER — HYDROCODONE BITARTRATE AND ACETAMINOPHEN 5; 325 MG/1; MG/1
0.5 TABLET ORAL 2 TIMES DAILY PRN
Qty: 14 TABLET | Refills: 0 | Status: SHIPPED | OUTPATIENT
Start: 2023-12-14 | End: 2023-12-28

## 2023-12-14 RX ORDER — HYDROCHLOROTHIAZIDE 12.5 MG/1
12.5 TABLET ORAL DAILY
COMMUNITY

## 2024-01-29 ENCOUNTER — OFFICE VISIT (OUTPATIENT)
Dept: ORTHOPEDIC SURGERY | Age: 84
End: 2024-01-29
Payer: MEDICARE

## 2024-01-29 VITALS — HEIGHT: 64 IN | BODY MASS INDEX: 21.85 KG/M2 | WEIGHT: 128 LBS

## 2024-01-29 DIAGNOSIS — G89.29 CHRONIC KNEE PAIN AFTER TOTAL REPLACEMENT OF RIGHT KNEE JOINT: Primary | ICD-10-CM

## 2024-01-29 DIAGNOSIS — M25.561 CHRONIC KNEE PAIN AFTER TOTAL REPLACEMENT OF RIGHT KNEE JOINT: Primary | ICD-10-CM

## 2024-01-29 DIAGNOSIS — Z96.651 CHRONIC KNEE PAIN AFTER TOTAL REPLACEMENT OF RIGHT KNEE JOINT: Primary | ICD-10-CM

## 2024-01-29 PROCEDURE — 99203 OFFICE O/P NEW LOW 30 MIN: CPT | Performed by: ORTHOPAEDIC SURGERY

## 2024-01-29 PROCEDURE — G8399 PT W/DXA RESULTS DOCUMENT: HCPCS | Performed by: ORTHOPAEDIC SURGERY

## 2024-01-29 PROCEDURE — 1036F TOBACCO NON-USER: CPT | Performed by: ORTHOPAEDIC SURGERY

## 2024-01-29 PROCEDURE — G8427 DOCREV CUR MEDS BY ELIG CLIN: HCPCS | Performed by: ORTHOPAEDIC SURGERY

## 2024-01-29 PROCEDURE — 1090F PRES/ABSN URINE INCON ASSESS: CPT | Performed by: ORTHOPAEDIC SURGERY

## 2024-01-29 PROCEDURE — 1123F ACP DISCUSS/DSCN MKR DOCD: CPT | Performed by: ORTHOPAEDIC SURGERY

## 2024-01-29 PROCEDURE — G8484 FLU IMMUNIZE NO ADMIN: HCPCS | Performed by: ORTHOPAEDIC SURGERY

## 2024-01-29 PROCEDURE — G8420 CALC BMI NORM PARAMETERS: HCPCS | Performed by: ORTHOPAEDIC SURGERY

## 2024-02-01 ENCOUNTER — HOSPITAL ENCOUNTER (OUTPATIENT)
Age: 84
Discharge: HOME | End: 2024-02-01
Payer: MEDICARE

## 2024-02-01 DIAGNOSIS — G89.29 CHRONIC KNEE PAIN AFTER TOTAL REPLACEMENT OF RIGHT KNEE JOINT: ICD-10-CM

## 2024-02-01 DIAGNOSIS — M25.561 CHRONIC KNEE PAIN AFTER TOTAL REPLACEMENT OF RIGHT KNEE JOINT: ICD-10-CM

## 2024-02-01 DIAGNOSIS — Z96.651 CHRONIC KNEE PAIN AFTER TOTAL REPLACEMENT OF RIGHT KNEE JOINT: ICD-10-CM

## 2024-02-01 LAB
BASOPHILS # BLD: 0.04 K/UL (ref 0–0.2)
BASOPHILS NFR BLD: 1 % (ref 0–2)
CRP SERPL HS-MCNC: <3 MG/L (ref 0–5)
EOSINOPHIL # BLD: 0.15 K/UL (ref 0.05–0.5)
EOSINOPHILS RELATIVE PERCENT: 2 % (ref 0–6)
ERYTHROCYTE [DISTWIDTH] IN BLOOD BY AUTOMATED COUNT: 12.3 % (ref 11.5–15)
ERYTHROCYTE [SEDIMENTATION RATE] IN BLOOD BY WESTERGREN METHOD: 22 MM/HR (ref 0–20)
HCT VFR BLD AUTO: 40 % (ref 34–48)
HGB BLD-MCNC: 13.1 G/DL (ref 11.5–15.5)
IMM GRANULOCYTES # BLD AUTO: <0.03 K/UL (ref 0–0.58)
IMM GRANULOCYTES NFR BLD: 0 % (ref 0–5)
LYMPHOCYTES NFR BLD: 1.94 K/UL (ref 1.5–4)
LYMPHOCYTES RELATIVE PERCENT: 22 % (ref 20–42)
MCH RBC QN AUTO: 30.8 PG (ref 26–35)
MCHC RBC AUTO-ENTMCNC: 32.8 G/DL (ref 32–34.5)
MCV RBC AUTO: 94.1 FL (ref 80–99.9)
MONOCYTES NFR BLD: 0.64 K/UL (ref 0.1–0.95)
MONOCYTES NFR BLD: 7 % (ref 2–12)
NEUTROPHILS NFR BLD: 68 % (ref 43–80)
NEUTS SEG NFR BLD: 5.97 K/UL (ref 1.8–7.3)
PLATELET # BLD AUTO: 256 K/UL (ref 130–450)
PMV BLD AUTO: 9.3 FL (ref 7–12)
RBC # BLD AUTO: 4.25 M/UL (ref 3.5–5.5)
WBC OTHER # BLD: 8.8 K/UL (ref 4.5–11.5)

## 2024-02-01 PROCEDURE — 36415 COLL VENOUS BLD VENIPUNCTURE: CPT

## 2024-02-01 PROCEDURE — 85652 RBC SED RATE AUTOMATED: CPT

## 2024-02-01 PROCEDURE — 85025 COMPLETE CBC W/AUTO DIFF WBC: CPT

## 2024-02-01 PROCEDURE — 86140 C-REACTIVE PROTEIN: CPT

## 2024-02-06 ENCOUNTER — TELEPHONE (OUTPATIENT)
Dept: ORTHOPEDIC SURGERY | Age: 84
End: 2024-02-06

## 2024-02-06 NOTE — TELEPHONE ENCOUNTER
Patient called office - states labs are completed and would like a call to discuss labs, was last seen in office on 1/29/2024 - h/o for right total knee arthroplasty with Dr. Almazan in June of 2022 -            Routed to providers for review

## 2024-02-15 ENCOUNTER — TELEPHONE (OUTPATIENT)
Dept: ORTHOPEDIC SURGERY | Age: 84
End: 2024-02-15

## 2024-02-15 NOTE — TELEPHONE ENCOUNTER
[facility]   Spironolactone Pregnancy And Lactation Text: This medication can cause feminization of the male fetus and should be avoided during pregnancy. The active metabolite is also found in breast milk.

## 2024-02-16 ENCOUNTER — HOSPITAL ENCOUNTER (OUTPATIENT)
Age: 84
End: 2024-02-16
Payer: MEDICARE

## 2024-02-16 ENCOUNTER — HOSPITAL ENCOUNTER (OUTPATIENT)
Dept: ULTRASOUND IMAGING | Age: 84
End: 2024-02-16
Payer: MEDICARE

## 2024-02-16 DIAGNOSIS — R22.42 MASS OF LOWER LEG, LEFT: ICD-10-CM

## 2024-02-16 PROCEDURE — 93971 EXTREMITY STUDY: CPT

## 2024-04-22 ENCOUNTER — HOSPITAL ENCOUNTER (OUTPATIENT)
Dept: MAMMOGRAPHY | Age: 84
Discharge: HOME | End: 2024-04-24
Payer: MEDICARE

## 2024-04-22 ENCOUNTER — HOSPITAL ENCOUNTER (OUTPATIENT)
Age: 84
Discharge: HOME | End: 2024-04-24
Payer: MEDICARE

## 2024-04-22 VITALS — WEIGHT: 128 LBS | HEIGHT: 64 IN | BODY MASS INDEX: 21.85 KG/M2

## 2024-04-22 DIAGNOSIS — Z12.31 ENCOUNTER FOR SCREENING MAMMOGRAM FOR MALIGNANT NEOPLASM OF BREAST: ICD-10-CM

## 2024-04-22 PROCEDURE — 77063 BREAST TOMOSYNTHESIS BI: CPT

## 2024-05-08 ENCOUNTER — TELEPHONE (OUTPATIENT)
Dept: ORTHOPEDIC SURGERY | Age: 84
End: 2024-05-08

## 2024-05-14 ENCOUNTER — TRANSCRIBE ORDERS (OUTPATIENT)
Dept: ADMINISTRATIVE | Age: 84
End: 2024-05-14

## 2024-05-14 DIAGNOSIS — I87.2 VENOUS INSUFFICIENCY: Primary | ICD-10-CM

## 2024-06-12 ENCOUNTER — HOSPITAL ENCOUNTER (OUTPATIENT)
Dept: CT IMAGING | Age: 84
Discharge: HOME | End: 2024-06-14
Attending: SPECIALIST
Payer: MEDICARE

## 2024-06-12 ENCOUNTER — HOSPITAL ENCOUNTER (OUTPATIENT)
Age: 84
Discharge: HOME | End: 2024-06-12
Payer: MEDICARE

## 2024-06-12 DIAGNOSIS — I87.2 VENOUS INSUFFICIENCY: ICD-10-CM

## 2024-06-12 LAB
BUN SERPL-MCNC: 10 MG/DL (ref 6–23)
CREAT SERPL-MCNC: 0.8 MG/DL (ref 0.5–1)
GFR, ESTIMATED: 78 ML/MIN/1.73M2

## 2024-06-12 PROCEDURE — 74174 CTA ABD&PLVS W/CONTRAST: CPT

## 2024-06-12 PROCEDURE — 84520 ASSAY OF UREA NITROGEN: CPT

## 2024-06-12 PROCEDURE — 36415 COLL VENOUS BLD VENIPUNCTURE: CPT

## 2024-06-12 PROCEDURE — 82565 ASSAY OF CREATININE: CPT

## 2024-06-12 PROCEDURE — 6360000004 HC RX CONTRAST MEDICATION: Performed by: RADIOLOGY

## 2024-06-12 RX ADMIN — IOPAMIDOL 75 ML: 755 INJECTION, SOLUTION INTRAVENOUS at 16:30

## 2024-07-19 ENCOUNTER — HOSPITAL ENCOUNTER (OUTPATIENT)
Age: 84
End: 2024-07-19
Payer: MEDICARE

## 2024-07-19 ENCOUNTER — HOSPITAL ENCOUNTER (OUTPATIENT)
Dept: ULTRASOUND IMAGING | Age: 84
End: 2024-07-19
Payer: MEDICARE

## 2024-07-19 DIAGNOSIS — R42 DIZZINESS AND GIDDINESS: ICD-10-CM

## 2024-07-19 PROCEDURE — 93880 EXTRACRANIAL BILAT STUDY: CPT

## 2024-08-28 ENCOUNTER — HOSPITAL ENCOUNTER (EMERGENCY)
Age: 84
Discharge: HOME | End: 2024-08-28
Payer: MEDICARE

## 2024-08-28 ENCOUNTER — APPOINTMENT (OUTPATIENT)
Dept: GENERAL RADIOLOGY | Age: 84
End: 2024-08-28
Payer: MEDICARE

## 2024-08-28 ENCOUNTER — APPOINTMENT (OUTPATIENT)
Dept: CT IMAGING | Age: 84
End: 2024-08-28
Payer: MEDICARE

## 2024-08-28 VITALS
OXYGEN SATURATION: 100 % | WEIGHT: 125 LBS | SYSTOLIC BLOOD PRESSURE: 163 MMHG | RESPIRATION RATE: 16 BRPM | HEART RATE: 99 BPM | TEMPERATURE: 97.1 F | BODY MASS INDEX: 21.46 KG/M2 | DIASTOLIC BLOOD PRESSURE: 93 MMHG

## 2024-08-28 DIAGNOSIS — S52.501A CLOSED FRACTURE OF DISTAL END OF RIGHT RADIUS, UNSPECIFIED FRACTURE MORPHOLOGY, INITIAL ENCOUNTER: ICD-10-CM

## 2024-08-28 DIAGNOSIS — M25.561 ACUTE PAIN OF RIGHT KNEE: ICD-10-CM

## 2024-08-28 DIAGNOSIS — M79.672 ACUTE FOOT PAIN, LEFT: ICD-10-CM

## 2024-08-28 DIAGNOSIS — S09.90XA CLOSED HEAD INJURY, INITIAL ENCOUNTER: Primary | ICD-10-CM

## 2024-08-28 PROCEDURE — 2500000003 HC RX 250 WO HCPCS: Performed by: PHYSICIAN ASSISTANT

## 2024-08-28 PROCEDURE — 73521 X-RAY EXAM HIPS BI 2 VIEWS: CPT

## 2024-08-28 PROCEDURE — 29125 APPL SHORT ARM SPLINT STATIC: CPT

## 2024-08-28 PROCEDURE — 73090 X-RAY EXAM OF FOREARM: CPT

## 2024-08-28 PROCEDURE — 12011 RPR F/E/E/N/L/M 2.5 CM/<: CPT

## 2024-08-28 PROCEDURE — 73110 X-RAY EXAM OF WRIST: CPT

## 2024-08-28 PROCEDURE — 6370000000 HC RX 637 (ALT 250 FOR IP): Performed by: PHYSICIAN ASSISTANT

## 2024-08-28 PROCEDURE — 70450 CT HEAD/BRAIN W/O DYE: CPT

## 2024-08-28 PROCEDURE — 73630 X-RAY EXAM OF FOOT: CPT

## 2024-08-28 PROCEDURE — 72125 CT NECK SPINE W/O DYE: CPT

## 2024-08-28 PROCEDURE — 73562 X-RAY EXAM OF KNEE 3: CPT

## 2024-08-28 PROCEDURE — 99284 EMERGENCY DEPT VISIT MOD MDM: CPT

## 2024-08-28 RX ORDER — LIDOCAINE HYDROCHLORIDE 10 MG/ML
5 INJECTION, SOLUTION EPIDURAL; INFILTRATION; INTRACAUDAL; PERINEURAL ONCE
Status: COMPLETED | OUTPATIENT
Start: 2024-08-28 | End: 2024-08-28

## 2024-08-28 RX ORDER — OXYCODONE AND ACETAMINOPHEN 5; 325 MG/1; MG/1
1 TABLET ORAL EVERY 6 HOURS PRN
Qty: 12 TABLET | Refills: 0 | Status: SHIPPED | OUTPATIENT
Start: 2024-08-28 | End: 2024-08-31

## 2024-08-28 RX ORDER — OXYCODONE AND ACETAMINOPHEN 5; 325 MG/1; MG/1
1 TABLET ORAL ONCE
Status: COMPLETED | OUTPATIENT
Start: 2024-08-28 | End: 2024-08-28

## 2024-08-28 RX ADMIN — LIDOCAINE HYDROCHLORIDE 5 ML: 10 INJECTION, SOLUTION EPIDURAL; INFILTRATION; INTRACAUDAL; PERINEURAL at 21:47

## 2024-08-28 RX ADMIN — OXYCODONE HYDROCHLORIDE AND ACETAMINOPHEN 1 TABLET: 5; 325 TABLET ORAL at 21:45

## 2024-08-28 ASSESSMENT — PAIN SCALES - GENERAL
PAINLEVEL_OUTOF10: 10
PAINLEVEL_OUTOF10: 8

## 2024-08-28 ASSESSMENT — PAIN - FUNCTIONAL ASSESSMENT: PAIN_FUNCTIONAL_ASSESSMENT: 0-10

## 2024-08-28 NOTE — ED TRIAGE NOTES
Department of Emergency Medicine    FIRST PROVIDER TRIAGE NOTE             Independent MLP           8/28/24  4:46 PM EDT    Date of Encounter: 8/28/24   MRN: [id number]    Vitals:    08/28/24 1646   BP: (!) 163/93   Pulse: 99   Resp: 16   Temp: 97.1 °F (36.2 °C)   SpO2: 100%   Weight: 56.7 kg (125 lb)      HPI: Matilde Mcpherson is a 83 y.o. female who presents to the ED for Fall (Mechanical fall, -LOC, + hit head, -blood thinners ), Head Injury, Wrist Injury (Right side ), Knee Injury (Hx of knee replacement ), and Foot Injury (Outside of foot is sore )     Denies LOC   Not on anticoagulation     ROS: Negative for cp, sob, abd pain, or back pain.    Physical Exam:   Gen Appearance/Constitutional: alert  HEENT: laceration to forehead, no active bleeding, surrounding hematoma   CV: regular rate  Musculoskeletal: Edema and TTP right wrist      Initial Plan of Care: All treatment areas with department are currently occupied.     Plan to order/Initiate the following while awaiting opening in ED: Triage evaluation  imaging studies.    Provider-Patient relationship only established for Provider In Triage (PIT).  Full assessment, HPI and examination not performed, therefore, it is not yet possible to state whether or not an emergency medical condition exists    Initial Plan of Care: Initiate Treatment-Testing, Proceed toTreatment Area When Bed Available for ED Attending/MLP to Continue Care  Secondary to high volume, low staffing, and/or boarding- patient to await bed availability.    This ends my PIT-Patient relationship.  Care of patient relinquished after triage    Electronically signed by Rosa Carter PA-C   DD: 8/28/24

## 2024-08-28 NOTE — ED PROVIDER NOTES
Independent NERY Visit.       [facility]  Department of Emergency Medicine   ED  Encounter Note  Admit Date/RoomTime: 2024  7:54 PM  ED Room:     NAME: Matilde Mcpherson  : 1940  MRN: [id number]     Chief Complaint:  Fall (Mechanical fall, -LOC, + hit head, -blood thinners ), Head Injury, Wrist Injury (Right side ), Knee Injury (Hx of knee replacement ), and Foot Injury (Outside of foot is sore )    History of Present Illness   Patient present accompanied with daughter in law.       Matilde Mcpherson is a 83 y.o. old female who presents to the emergency department by private vehicle, for a mechanical fall which occured 1 hour(s) prior to arrival. She reportedly tripped and fell while ambulating in her driveway and fell forward while at home prior to incident with complaints of head injury, right knee pain, left foot pain and right wrist pain.    The patients tetanus status is within past 10 years.   Since onset the symptoms have been unchanged.  Her pain is aggraveated by certain movements or pressure on or palpation of painful area and relieved by nothing, as no treatment has been provided prior to this visit.  She denies any loss of consciousness, confusion, dizziness, neck pain, chest pain, abdominal pain, back pain, numbness, weakness, blurred vision, nausea, vomiting, fever, chills, or rash.  She takes no blood thinning agents.    Glascow Coma Scale:  Best Eye Response 4 - Opens eyes on own   Best Verbal Response 5 - Alert and oriented   Best Motor Response 6 - Follows simple motor commands   Total Score 15      ROS   Pertinent positives and negatives are stated within HPI, all other systems reviewed and are negative.    Past Medical History:  has a past medical history of Arthritis, History of double vision, Hyperlipidemia, Hypertension, Joint pain, Low back pain, PONV (postoperative nausea and vomiting), Raynaud's disease, Right knee pain, and TIA (transient ischemic  attack).    Surgical History:  has a past surgical history that includes Appendectomy; laparoscopy (1997); Upper gastrointestinal endoscopy (09/26/2017); Colonoscopy (09/26/2017); eye surgery (Bilateral); hc inject other perphrl nerv (Bilateral, 09/12/2019); Nerve Block (Bilateral, 10/12/2020); Nerve Block (Bilateral, 08/23/2021); Tonsillectomy; Pain management procedure (Right, 08/07/2023); Nerve Block (Bilateral, 09/25/2023); Pain management procedure (Right, 10/09/2023); Pain management procedure (Right, 11/20/2023); and Breast biopsy.    Social History:  reports that she has never smoked. She has never used smokeless tobacco. She reports that she does not drink alcohol and does not use drugs.    Family History: family history includes Breast Cancer in her cousin and cousin; Cancer in her mother; Heart Attack (age of onset: 47) in her father; Heart Disease in her father.     Allergies: Patient has no known allergies.    Physical Exam   Oxygen Saturation Interpretation: Normal.        ED Triage Vitals [08/28/24 1646]   BP Systolic BP Percentile Diastolic BP Percentile Temp Temp src Pulse Respirations SpO2   (!) 163/93 -- -- 97.1 °F (36.2 °C) -- 99 16 100 %      Height Weight - Scale         -- 56.7 kg (125 lb)               Physical Exam  Constitutional:  Alert, development consistent with age.  HEENT:  NC/NT. TTP right side of forehead with hematoma and small amount of active bleeding. No retained foreign body or signs of infection, PERRLA, EOMI bilaterally without pain, mild ammount of ecchymosis over nasal bridge, No bleeding from nostrils. No septal hematomas bilaterally. Airway patent.  Neck:  No midline or paravertebral tenderness.  Normal ROM.  Supple.  Chest:  Symmetrical without visible rash or tenderness.  Respiratory:  Lungs Clear to auscultation and breath sounds equal.  CV:  Regular rate and rhythm, normal heart sounds, without pathological murmurs, ectopy, gallops, or rubs.  GI:  Abdomen Soft,

## 2024-08-29 NOTE — PROGRESS NOTES
Spoke with patient to schedule appointment.  She wishes not to schedule appointment at this time.  I instructed her that she does need to follow up with ortho for her wrist fx next week or the week after per Dr Herron.  She was provided with phone number to call back and schedule.

## 2024-09-05 ENCOUNTER — OFFICE VISIT (OUTPATIENT)
Dept: ORTHOPEDIC SURGERY | Age: 84
End: 2024-09-05

## 2024-09-05 VITALS — WEIGHT: 125 LBS | BODY MASS INDEX: 21.34 KG/M2 | HEIGHT: 64 IN

## 2024-09-05 DIAGNOSIS — S52.501A CLOSED FRACTURE OF DISTAL ENDS OF RIGHT RADIUS AND ULNA, INITIAL ENCOUNTER: Primary | ICD-10-CM

## 2024-09-05 DIAGNOSIS — S52.601A CLOSED FRACTURE OF DISTAL ENDS OF RIGHT RADIUS AND ULNA, INITIAL ENCOUNTER: Primary | ICD-10-CM

## 2024-09-05 RX ORDER — OXYCODONE AND ACETAMINOPHEN 5; 325 MG/1; MG/1
1 TABLET ORAL EVERY 6 HOURS PRN
Qty: 28 TABLET | Refills: 0 | Status: SHIPPED | OUTPATIENT
Start: 2024-09-05 | End: 2024-09-12

## 2024-09-05 NOTE — PROGRESS NOTES
Bilateral 2021    BILATERAL SACROILLAC JOINT INJECTIONS  performed by Francisco Hernandez MD at [facility]    NERVE BLOCK Bilateral 2023    BILATERAL SACROILIAC JOINT INJECTION UNDER FLUOROSCOPIC GUIDANCE performed by Francisco Hernandez MD at [facility]    PAIN MANAGEMENT PROCEDURE Right 2023    LUMBAR TRANSFORAMINAL EPIDURAL STEROID INJECTION RIGHT L2 AND L3 UNDER FLUOROSCOPIC GUIDANCE performed by Francisco Hernandez MD at [facility]    PAIN MANAGEMENT PROCEDURE Right 10/09/2023    RIGHT KNEE GENICULAR NERVES BLOCK UNDER FLUOROSCOPY performed by Francisco Hernandez MD at [facility]    PAIN MANAGEMENT PROCEDURE Right 2023    LUMBAR TRANSFORAMINAL EPIDURAL STEROID INJECTION RIGHT L3 AND L4 UNDER FLUOROSCOPIC GUIDANCE performed by Francisco Hernandez MD at [facility]    TONSILLECTOMY      UPPER GASTROINTESTINAL ENDOSCOPY  2017       FAMILY HISTORY   Family History   Problem Relation Age of Onset    Cancer Mother         breast    Heart Disease Father     Heart Attack Father 47         MI    Breast Cancer Cousin     Breast Cancer Cousin        SOCIAL HISTORY  Social History     Occupational History    Occupation: retired   Tobacco Use    Smoking status: Never    Smokeless tobacco: Never   Vaping Use    Vaping status: Never Used   Substance and Sexual Activity    Alcohol use: No     Alcohol/week: 0.0 standard drinks of alcohol    Drug use: No    Sexual activity: Not on file       CURRENT MEDICATIONS     Current Outpatient Medications:     hydroCHLOROthiazide (HYDRODIURIL) 12.5 MG tablet, Take 1 tablet by mouth daily, Disp: , Rfl:     simvastatin (ZOCOR) 20 MG tablet, Take 1 tablet by mouth nightly, Disp: , Rfl:     ibuprofen (ADVIL;MOTRIN) 200 MG tablet, Take 1 tablet by mouth every 6 hours as needed for Pain, Disp: , Rfl:     aspirin 81 MG EC tablet, Take 1 tablet by mouth daily, Disp: , Rfl:     acetaminophen (TYLENOL) 500 MG tablet, Take 2 tablets by mouth every 6 hours as needed,

## (undated) DEVICE — BANDAGE ADH W1XL3IN NAT FAB WVN FLX DURABLE N ADH PD SEAL

## (undated) DEVICE — GAUZE,SPONGE,4"X4",8PLY,STRL,LF,10/TRAY: Brand: MEDLINE

## (undated) DEVICE — GAUZE,SPONGE,4"X4",12PLY,STERILE,LF,2'S: Brand: MEDLINE

## (undated) DEVICE — NEEDLE HYPO 18GA L1.5IN PNK POLYPR HUB S STL THN WALL FILL

## (undated) DEVICE — NEEDLE HYPO 25GA L1.5IN BLU POLYPR HUB S STL REG BVL STR

## (undated) DEVICE — NON-DEHP CATHETER EXTENSION SET, MALE LUER LOCK ADAPTER

## (undated) DEVICE — SYRINGE, LUER LOCK, 5ML: Brand: MEDLINE

## (undated) DEVICE — 6 ML SYRINGE LUER-LOCK TIP: Brand: MONOJECT

## (undated) DEVICE — 12 ML SYRINGE,LUER-LOCK TIP: Brand: MONOJECT

## (undated) DEVICE — Device: Brand: PORTEX

## (undated) DEVICE — 3M™ RED DOT™ MONITORING ELECTRODE WITH FOAM TAPE AND STICKY GEL 2560, 50/BAG, 20/CASE, 72/PLT: Brand: RED DOT™

## (undated) DEVICE — Z DISCONTINUED APPLICATOR SURG PREP 0.35OZ 2% CHG 70% ISO ALC W/ HI LT

## (undated) DEVICE — GLOVE ORANGE PI 7 1/2   MSG9075

## (undated) DEVICE — NEEDLE HYPO 18GA L1.5IN PNK POLYPR HUB S STL REG BVL STR